# Patient Record
Sex: MALE | Race: WHITE | NOT HISPANIC OR LATINO | URBAN - METROPOLITAN AREA
[De-identification: names, ages, dates, MRNs, and addresses within clinical notes are randomized per-mention and may not be internally consistent; named-entity substitution may affect disease eponyms.]

---

## 2022-10-21 ENCOUNTER — INPATIENT (INPATIENT)
Facility: HOSPITAL | Age: 87
LOS: 4 days | Discharge: HOME | DRG: 388 | End: 2022-10-26
Attending: SURGERY | Admitting: SURGERY
Payer: MEDICARE

## 2022-10-21 VITALS
RESPIRATION RATE: 20 BRPM | SYSTOLIC BLOOD PRESSURE: 151 MMHG | WEIGHT: 176.37 LBS | OXYGEN SATURATION: 97 % | HEIGHT: 71 IN | TEMPERATURE: 98 F | HEART RATE: 86 BPM | DIASTOLIC BLOOD PRESSURE: 75 MMHG

## 2022-10-21 DIAGNOSIS — K56.609 UNSPECIFIED INTESTINAL OBSTRUCTION, UNSPECIFIED AS TO PARTIAL VERSUS COMPLETE OBSTRUCTION: ICD-10-CM

## 2022-10-21 DIAGNOSIS — Z90.49 ACQUIRED ABSENCE OF OTHER SPECIFIED PARTS OF DIGESTIVE TRACT: Chronic | ICD-10-CM

## 2022-10-21 LAB
ALBUMIN SERPL ELPH-MCNC: 3.3 G/DL — LOW (ref 3.5–5)
ALP SERPL-CCNC: 95 U/L — SIGNIFICANT CHANGE UP (ref 40–120)
ALT FLD-CCNC: 23 U/L DA — SIGNIFICANT CHANGE UP (ref 10–60)
ANION GAP SERPL CALC-SCNC: 7 MMOL/L — SIGNIFICANT CHANGE UP (ref 5–17)
ANISOCYTOSIS BLD QL: SLIGHT — SIGNIFICANT CHANGE UP
APPEARANCE UR: CLEAR — SIGNIFICANT CHANGE UP
APTT BLD: 28.2 SEC — SIGNIFICANT CHANGE UP (ref 27.5–35.5)
AST SERPL-CCNC: 13 U/L — SIGNIFICANT CHANGE UP (ref 10–40)
BACTERIA # UR AUTO: ABNORMAL /HPF
BASE EXCESS BLDV CALC-SCNC: 4.7 MMOL/L — SIGNIFICANT CHANGE UP
BASOPHILS # BLD AUTO: 0.01 K/UL — SIGNIFICANT CHANGE UP (ref 0–0.2)
BASOPHILS NFR BLD AUTO: 0.2 % — SIGNIFICANT CHANGE UP (ref 0–2)
BILIRUB SERPL-MCNC: 0.8 MG/DL — SIGNIFICANT CHANGE UP (ref 0.2–1.2)
BILIRUB UR-MCNC: NEGATIVE — SIGNIFICANT CHANGE UP
BLD GP AB SCN SERPL QL: SIGNIFICANT CHANGE UP
BUN SERPL-MCNC: 24 MG/DL — HIGH (ref 7–18)
CA-I SERPL-SCNC: SIGNIFICANT CHANGE UP MMOL/L (ref 1.15–1.33)
CALCIUM SERPL-MCNC: 9.2 MG/DL — SIGNIFICANT CHANGE UP (ref 8.4–10.5)
CHLORIDE SERPL-SCNC: 105 MMOL/L — SIGNIFICANT CHANGE UP (ref 96–108)
CO2 SERPL-SCNC: 29 MMOL/L — SIGNIFICANT CHANGE UP (ref 22–31)
COLOR SPEC: YELLOW — SIGNIFICANT CHANGE UP
CREAT SERPL-MCNC: 1.38 MG/DL — HIGH (ref 0.5–1.3)
DIFF PNL FLD: NEGATIVE — SIGNIFICANT CHANGE UP
EGFR: 50 ML/MIN/1.73M2 — LOW
EOSINOPHIL # BLD AUTO: 0 K/UL — SIGNIFICANT CHANGE UP (ref 0–0.5)
EOSINOPHIL NFR BLD AUTO: 0 % — SIGNIFICANT CHANGE UP (ref 0–6)
EPI CELLS # UR: ABNORMAL /HPF
GAS PNL BLDV: 136 MMOL/L — SIGNIFICANT CHANGE UP (ref 136–145)
GAS PNL BLDV: SIGNIFICANT CHANGE UP
GLUCOSE SERPL-MCNC: 238 MG/DL — HIGH (ref 70–99)
GLUCOSE UR QL: 100 MG/DL
HCO3 BLDV-SCNC: 31 MMOL/L — HIGH (ref 22–29)
HCT VFR BLD CALC: 37.4 % — LOW (ref 39–50)
HGB BLD-MCNC: 12.5 G/DL — LOW (ref 13–17)
IMM GRANULOCYTES NFR BLD AUTO: 0.3 % — SIGNIFICANT CHANGE UP (ref 0–0.9)
INR BLD: 1.37 RATIO — HIGH (ref 0.88–1.16)
KETONES UR-MCNC: ABNORMAL
LACTATE BLDV-MCNC: 1.8 MMOL/L — SIGNIFICANT CHANGE UP (ref 0.5–2)
LACTATE SERPL-SCNC: 1.5 MMOL/L — SIGNIFICANT CHANGE UP (ref 0.7–2)
LEUKOCYTE ESTERASE UR-ACNC: NEGATIVE — SIGNIFICANT CHANGE UP
LIDOCAIN IGE QN: 47 U/L — LOW (ref 73–393)
LYMPHOCYTES # BLD AUTO: 0.32 K/UL — LOW (ref 1–3.3)
LYMPHOCYTES # BLD AUTO: 5.4 % — LOW (ref 13–44)
MACROCYTES BLD QL: SLIGHT — SIGNIFICANT CHANGE UP
MANUAL SMEAR VERIFICATION: SIGNIFICANT CHANGE UP
MCHC RBC-ENTMCNC: 32.5 PG — SIGNIFICANT CHANGE UP (ref 27–34)
MCHC RBC-ENTMCNC: 33.4 GM/DL — SIGNIFICANT CHANGE UP (ref 32–36)
MCV RBC AUTO: 97.1 FL — SIGNIFICANT CHANGE UP (ref 80–100)
MONOCYTES # BLD AUTO: 0.73 K/UL — SIGNIFICANT CHANGE UP (ref 0–0.9)
MONOCYTES NFR BLD AUTO: 12.2 % — SIGNIFICANT CHANGE UP (ref 2–14)
NEUTROPHILS # BLD AUTO: 4.88 K/UL — SIGNIFICANT CHANGE UP (ref 1.8–7.4)
NEUTROPHILS NFR BLD AUTO: 81.9 % — HIGH (ref 43–77)
NITRITE UR-MCNC: NEGATIVE — SIGNIFICANT CHANGE UP
NRBC # BLD: 0 /100 WBCS — SIGNIFICANT CHANGE UP (ref 0–0)
OVALOCYTES BLD QL SMEAR: SLIGHT — SIGNIFICANT CHANGE UP
PCO2 BLDV: 51 MMHG — SIGNIFICANT CHANGE UP (ref 42–55)
PH BLDV: 7.39 — SIGNIFICANT CHANGE UP (ref 7.32–7.43)
PH UR: 5 — SIGNIFICANT CHANGE UP (ref 5–8)
PLAT MORPH BLD: NORMAL — SIGNIFICANT CHANGE UP
PLATELET # BLD AUTO: 153 K/UL — SIGNIFICANT CHANGE UP (ref 150–400)
PO2 BLDV: 25 MMHG — SIGNIFICANT CHANGE UP
POIKILOCYTOSIS BLD QL AUTO: SLIGHT — SIGNIFICANT CHANGE UP
POTASSIUM BLDV-SCNC: 4.8 MMOL/L — SIGNIFICANT CHANGE UP (ref 3.5–5.1)
POTASSIUM SERPL-MCNC: 4.8 MMOL/L — SIGNIFICANT CHANGE UP (ref 3.5–5.3)
POTASSIUM SERPL-SCNC: 4.8 MMOL/L — SIGNIFICANT CHANGE UP (ref 3.5–5.3)
PROT SERPL-MCNC: 7.2 G/DL — SIGNIFICANT CHANGE UP (ref 6–8.3)
PROT UR-MCNC: 15
PROTHROM AB SERPL-ACNC: 16.4 SEC — HIGH (ref 10.5–13.4)
RBC # BLD: 3.85 M/UL — LOW (ref 4.2–5.8)
RBC # FLD: 13 % — SIGNIFICANT CHANGE UP (ref 10.3–14.5)
RBC BLD AUTO: ABNORMAL
RBC CASTS # UR COMP ASSIST: SIGNIFICANT CHANGE UP /HPF (ref 0–2)
SAO2 % BLDV: 43.1 % — SIGNIFICANT CHANGE UP
SARS-COV-2 RNA SPEC QL NAA+PROBE: SIGNIFICANT CHANGE UP
SODIUM SERPL-SCNC: 141 MMOL/L — SIGNIFICANT CHANGE UP (ref 135–145)
SP GR SPEC: 1.01 — SIGNIFICANT CHANGE UP (ref 1.01–1.02)
UROBILINOGEN FLD QL: NEGATIVE — SIGNIFICANT CHANGE UP
WBC # BLD: 5.96 K/UL — SIGNIFICANT CHANGE UP (ref 3.8–10.5)
WBC # FLD AUTO: 5.96 K/UL — SIGNIFICANT CHANGE UP (ref 3.8–10.5)
WBC UR QL: SIGNIFICANT CHANGE UP /HPF (ref 0–5)

## 2022-10-21 PROCEDURE — 99221 1ST HOSP IP/OBS SF/LOW 40: CPT | Mod: GC

## 2022-10-21 PROCEDURE — 71045 X-RAY EXAM CHEST 1 VIEW: CPT | Mod: 26

## 2022-10-21 PROCEDURE — 71045 X-RAY EXAM CHEST 1 VIEW: CPT | Mod: 26,77

## 2022-10-21 PROCEDURE — 99285 EMERGENCY DEPT VISIT HI MDM: CPT

## 2022-10-21 PROCEDURE — G1004: CPT

## 2022-10-21 PROCEDURE — 74177 CT ABD & PELVIS W/CONTRAST: CPT | Mod: 26,ME

## 2022-10-21 RX ORDER — ONDANSETRON 8 MG/1
4 TABLET, FILM COATED ORAL ONCE
Refills: 0 | Status: COMPLETED | OUTPATIENT
Start: 2022-10-21 | End: 2022-10-21

## 2022-10-21 RX ORDER — MORPHINE SULFATE 50 MG/1
4 CAPSULE, EXTENDED RELEASE ORAL ONCE
Refills: 0 | Status: DISCONTINUED | OUTPATIENT
Start: 2022-10-21 | End: 2022-10-21

## 2022-10-21 RX ORDER — SODIUM CHLORIDE 9 MG/ML
1000 INJECTION INTRAMUSCULAR; INTRAVENOUS; SUBCUTANEOUS ONCE
Refills: 0 | Status: COMPLETED | OUTPATIENT
Start: 2022-10-21 | End: 2022-10-21

## 2022-10-21 RX ORDER — PANTOPRAZOLE SODIUM 20 MG/1
8 TABLET, DELAYED RELEASE ORAL
Qty: 80 | Refills: 0 | Status: DISCONTINUED | OUTPATIENT
Start: 2022-10-21 | End: 2022-10-21

## 2022-10-21 RX ORDER — SODIUM CHLORIDE 9 MG/ML
1000 INJECTION, SOLUTION INTRAVENOUS
Refills: 0 | Status: DISCONTINUED | OUTPATIENT
Start: 2022-10-21 | End: 2022-10-26

## 2022-10-21 RX ORDER — PANTOPRAZOLE SODIUM 20 MG/1
40 TABLET, DELAYED RELEASE ORAL
Refills: 0 | Status: DISCONTINUED | OUTPATIENT
Start: 2022-10-21 | End: 2022-10-26

## 2022-10-21 RX ORDER — ONDANSETRON 8 MG/1
4 TABLET, FILM COATED ORAL EVERY 6 HOURS
Refills: 0 | Status: DISCONTINUED | OUTPATIENT
Start: 2022-10-21 | End: 2022-10-26

## 2022-10-21 RX ORDER — FAMOTIDINE 10 MG/ML
20 INJECTION INTRAVENOUS ONCE
Refills: 0 | Status: COMPLETED | OUTPATIENT
Start: 2022-10-21 | End: 2022-10-21

## 2022-10-21 RX ORDER — ONDANSETRON 8 MG/1
4 TABLET, FILM COATED ORAL ONCE
Refills: 0 | Status: DISCONTINUED | OUTPATIENT
Start: 2022-10-21 | End: 2022-10-26

## 2022-10-21 RX ADMIN — FAMOTIDINE 20 MILLIGRAM(S): 10 INJECTION INTRAVENOUS at 11:18

## 2022-10-21 RX ADMIN — ONDANSETRON 4 MILLIGRAM(S): 8 TABLET, FILM COATED ORAL at 11:18

## 2022-10-21 RX ADMIN — MORPHINE SULFATE 4 MILLIGRAM(S): 50 CAPSULE, EXTENDED RELEASE ORAL at 18:13

## 2022-10-21 RX ADMIN — MORPHINE SULFATE 4 MILLIGRAM(S): 50 CAPSULE, EXTENDED RELEASE ORAL at 17:43

## 2022-10-21 RX ADMIN — ONDANSETRON 4 MILLIGRAM(S): 8 TABLET, FILM COATED ORAL at 20:10

## 2022-10-21 RX ADMIN — PANTOPRAZOLE SODIUM 10 MG/HR: 20 TABLET, DELAYED RELEASE ORAL at 11:30

## 2022-10-21 RX ADMIN — MORPHINE SULFATE 4 MILLIGRAM(S): 50 CAPSULE, EXTENDED RELEASE ORAL at 11:19

## 2022-10-21 RX ADMIN — MORPHINE SULFATE 4 MILLIGRAM(S): 50 CAPSULE, EXTENDED RELEASE ORAL at 11:49

## 2022-10-21 RX ADMIN — SODIUM CHLORIDE 120 MILLILITER(S): 9 INJECTION, SOLUTION INTRAVENOUS at 20:10

## 2022-10-21 RX ADMIN — Medication 30 MILLILITER(S): at 11:18

## 2022-10-21 RX ADMIN — SODIUM CHLORIDE 1000 MILLILITER(S): 9 INJECTION INTRAMUSCULAR; INTRAVENOUS; SUBCUTANEOUS at 11:19

## 2022-10-21 NOTE — H&P ADULT - NSHPLABSRESULTS_GEN_ALL_CORE
Vital Signs Last 24 Hrs  T(C): 36.8 (21 Oct 2022 15:47), Max: 36.9 (21 Oct 2022 10:11)  T(F): 98.3 (21 Oct 2022 15:47), Max: 98.5 (21 Oct 2022 10:11)  HR: 79 (21 Oct 2022 15:47) (79 - 86)  BP: 155/81 (21 Oct 2022 15:47) (151/75 - 155/81)  BP(mean): --  RR: 20 (21 Oct 2022 15:47) (20 - 20)  SpO2: 98% (21 Oct 2022 15:47) (97% - 98%)    Parameters below as of 21 Oct 2022 15:47  Patient On (Oxygen Delivery Method): room air                              12.5   5.96  )-----------( 153      ( 21 Oct 2022 11:09 )             37.4       10-21    141  |  105  |  24<H>  ----------------------------<  238<H>  4.8   |  29  |  1.38<H>    Ca    9.2      21 Oct 2022 11:09    TPro  7.2  /  Alb  3.3<L>  /  TBili  0.8  /  DBili  x   /  AST  13  /  ALT  23  /  AlkPhos  95  10-21        < from: CT Abdomen and Pelvis w/ IV Cont (10.21.22 @ 14:49) >    FINDINGS:  LOWER CHEST: Mildatelectatic changes. Trace bilateral pleural fluid.    LIVER: Within normal limits.  BILE DUCTS: Intrahepatic and extra hepatic bile duct dilatation which may   be postsurgical.  GALLBLADDER: Cholecystectomy.  SPLEEN: Within normal limits.  PANCREAS:Within normal limits.  ADRENALS: Within normal limits.  KIDNEYS/URETERS: Within normal limits.    BLADDER: Within normal limits.  REPRODUCTIVE ORGANS: Radiopaque fiduciary markers.    BOWEL: Dilated small bowel to the point of segmental small bowel wall   thickening in the right side of the abdomen with transition seen on   series 2 image 88. Differential diagnostic possibilities include   segmental enteritis from ischemia with proximal dilatation. Infectious or   inflammatory etiologies are within the differential diagnosis.  Colonic diverticuli without gross inflammation.    PERITONEUM: Mild ascites in the right side of the abdomen laterally.  VESSELS: Vascular calcifications. No thrombus is clearly identified.  RETROPERITONEUM/LYMPH NODES:No lymphadenopathy.  ABDOMINAL WALL: Small umbilical hernia containing fat. Left inguinal   hernia containing fat.  BONES: Degenerative changes of bone.    IMPRESSION:  Distended small bowel with transition seen in the right side of the   abdomen where there is a long segment of thick-walled small bowel with   adjacent mild ascites. This may be related to enteritis from bowel   ischemia. Small bowel obstruction with an inflamed segment of small bowel   may be within the differential diagnosis as well as enteritis from   infectious or inflammatory etiologies.    < end of copied text >

## 2022-10-21 NOTE — CONSULT NOTE ADULT - SUBJECTIVE AND OBJECTIVE BOX
Patient is a 87y old  Male who presents with a chief complaint of Hematemesis (21 Oct 2022 19:57)      HPI:  87 year-old male with PMH of BCC on left ear s/p wide excision, prostate CA s/p brachytherapy (seed implants seen on CT, however pt unsure of details), PSH of lap cholecystectomy and appendectomy; presented to the hospital with complaints of abdominal pain with 4 day history of nausea, vomiting and eventually hematemesis. Had bowel movements and passing flatus. Admits to fever yesterday but no chills. No similar episodes in the past. Denies smoking, occasional drinks, mostly 1 bottle of beer each time.     ED work up revealed pSBO with findings suspicious of bowel ischemia given trace ascites. No leukocytosis, lactate wnl, vitals stable. Abdomen exam benign. Given findings c/w bowel obstruction, NGT placed at bedside with 250cc of bilious return.  (21 Oct 2022 19:57)      PAST MEDICAL & SURGICAL HISTORY:  Basal cell carcinoma  S/P cholecystectomy  S/P appendectomy          SOCIAL HX:   Smoking                         ETOH                               ROS:      Allergies  No Known Allergies  Intolerances          PHYSICAL EXAM    ICU Vital Signs Last 24 Hrs  T(C): 36.8 (21 Oct 2022 20:33), Max: 36.9 (21 Oct 2022 10:11)  T(F): 98.2 (21 Oct 2022 20:33), Max: 98.5 (21 Oct 2022 10:11)  HR: 73 (21 Oct 2022 20:33) (73 - 86)  BP: 151/78 (21 Oct 2022 20:33) (151/75 - 155/81)  RR: 20 (21 Oct 2022 20:33) (20 - 20)  SpO2: 97% (21 Oct 2022 20:33) (97% - 98%)    O2 Parameters below as of 21 Oct 2022 20:33  Patient On (Oxygen Delivery Method): room air          LABS:                          12.5   5.96  )-----------( 153      ( 21 Oct 2022 11:09 )             37.4                                               10-    141  |  105  |  24<H>  ----------------------------<  238<H>  4.8   |  29  |  1.38<H>    Ca    9.2      21 Oct 2022 11:09    TPro  7.2  /  Alb  3.3<L>  /  TBili  0.8  /  DBili  x   /  AST  13  /  ALT  23  /  AlkPhos  95  10-21      PT/INR - ( 21 Oct 2022 11:09 )   PT: 16.4 sec;   INR: 1.37 ratio         PTT - ( 21 Oct 2022 11:09 )  PTT:28.2 sec                                       Urinalysis Basic - ( 21 Oct 2022 16:35 )    Color: Yellow / Appearance: Clear / S.015 / pH: x  Gluc: x / Ketone: Small  / Bili: Negative / Urobili: Negative   Blood: x / Protein: 15 / Nitrite: Negative   Leuk Esterase: Negative / RBC: 0-2 /HPF / WBC 0-2 /HPF   Sq Epi: x / Non Sq Epi: Occasional /HPF / Bacteria: Trace /HPF                                                  LIVER FUNCTIONS - ( 21 Oct 2022 11:09 )  Alb: 3.3 g/dL / Pro: 7.2 g/dL / ALK PHOS: 95 U/L / ALT: 23 U/L DA / AST: 13 U/L / GGT: x                                                MEDICATIONS  (STANDING):  dextrose 5% + sodium chloride 0.45%. 1000 milliLiter(s) (120 mL/Hr) IV Continuous <Continuous>  ondansetron Injectable 4 milliGRAM(s) IV Push once  pantoprazole  Injectable 40 milliGRAM(s) IV Push two times a day    MEDICATIONS  (PRN):  ondansetron Injectable 4 milliGRAM(s) IV Push every 6 hours PRN Nausea and/or Vomiting         Patient is a 87y old  Male who presents with a chief complaint of Hematemesis (21 Oct 2022 19:57)      HPI:  87 year-old male with 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 Had bowel movements and passing flatus. Admits to fever yesterday but no chills. No similar episodes in the past. Denies smoking, occasional drinks, mostly 1 bottle of beer each time.     ED work up revealed SBO with findings suspicious of bowel ischemia given trace ascites. No leukocytosis, lactate wnl, vitals stable. Abdomen exam benign. Given findings c/w bowel obstruction, NGT placed at bedside with 250cc of bilious return.  (21 Oct 2022 19:57)  Medicine was consulted for co-management of comorbidities.       PAST MEDICAL & SURGICAL HISTORY:  Basal cell carcinoma  S/P cholecystectomy  S/P appendectomy          SOCIAL HX:   Smoking                         ETOH                               ROS:    Constitutional- no fever, chills, weight loss, weight gain or generalized weakness  Eyes – no vision loss or changes, no pain, no redness  ENT  – No hearing changes, no tinnitus, no discharge, no pain  - No runny nose, no congestion, no pain  - No sore throat, no hoarseness, no mouth sores, no voice change  CVS – No chest pain/no palpitations, or SOB  Respiratory - no cough, hemoptysis, wheezing, or SOB  GI – no dysphagia, heartburn, nausea, anorexia, emesis, diarrhea, abd pain, or change in bowel habits   – no frequency, urgency, hesitancy, nocturia, discharge, or weak stream  Skin – no rashes, lumps, or pruritus  MAYA – no joint pain, stiffness, back pain, redness or swelling in joints  Psych – no confusion, depression , anxiety, or insomnia  Neuro – no headache dizziness, syncope, seizures, tremors, numbness, amnesia, or falls  Endocrine – no cold, heat intolerance, sweating, excessive hunger or thirst    Allergies  No Known Allergies  Intolerances          PHYSICAL EXAM    ICU Vital Signs Last 24 Hrs  T(C): 36.8 (21 Oct 2022 20:33), Max: 36.9 (21 Oct 2022 10:11)  T(F): 98.2 (21 Oct 2022 20:33), Max: 98.5 (21 Oct 2022 10:11)  HR: 73 (21 Oct 2022 20:33) (73 - 86)  BP: 151/78 (21 Oct 2022 20:33) (151/75 - 155/81)  RR: 20 (21 Oct 2022 20:33) (20 - 20)  SpO2: 97% (21 Oct 2022 20:33) (97% - 98%)    O2 Parameters below as of 21 Oct 2022 20:33  Patient On (Oxygen Delivery Method): room air    GENERAL: NAD, overweight, sat well on RA   HEAD:  Atraumatic, Normocephalic  EYES:  conjunctiva and sclera clear  NECK: Supple, No JVD, Normal thyroid  CHEST/LUNG: Clear to auscultation. Clear to percussion bilaterally; No rales, rhonchi, wheezing, or rubs  HEART: Regular rate and rhythm; No murmurs, rubs, or gallops  ABDOMEN: Soft, Nontender, Nondistended; Bowel sounds present, no pain or masses on palpation   NERVOUS SYSTEM:  Alert & Oriented X3  EXTREMITIES:  2+ Peripheral Pulses, No clubbing, cyanosis, or edema  : voiding well   SKIN: warm, dry      LABS:                          12.5   5.96  )-----------( 153      ( 21 Oct 2022 11:09 )             37.4                                               10-21    141  |  105  |  24<H>  ----------------------------<  238<H>  4.8   |  29  |  1.38<H>    Ca    9.2      21 Oct 2022 11:09    TPro  7.2  /  Alb  3.3<L>  /  TBili  0.8  /  DBili  x   /  AST  13  /  ALT  23  /  AlkPhos  95  10-21      PT/INR - ( 21 Oct 2022 11:09 )   PT: 16.4 sec;   INR: 1.37 ratio         PTT - ( 21 Oct 2022 11:09 )  PTT:28.2 sec                                       Urinalysis Basic - ( 21 Oct 2022 16:35 )    Color: Yellow / Appearance: Clear / S.015 / pH: x  Gluc: x / Ketone: Small  / Bili: Negative / Urobili: Negative   Blood: x / Protein: 15 / Nitrite: Negative   Leuk Esterase: Negative / RBC: 0-2 /HPF / WBC 0-2 /HPF   Sq Epi: x / Non Sq Epi: Occasional /HPF / Bacteria: Trace /HPF                                                  LIVER FUNCTIONS - ( 21 Oct 2022 11:09 )  Alb: 3.3 g/dL / Pro: 7.2 g/dL / ALK PHOS: 95 U/L / ALT: 23 U/L DA / AST: 13 U/L / GGT: x                                                MEDICATIONS  (STANDING):  dextrose 5% + sodium chloride 0.45%. 1000 milliLiter(s) (120 mL/Hr) IV Continuous <Continuous>  ondansetron Injectable 4 milliGRAM(s) IV Push once  pantoprazole  Injectable 40 milliGRAM(s) IV Push two times a day    MEDICATIONS  (PRN):  ondansetron Injectable 4 milliGRAM(s) IV Push every 6 hours PRN Nausea and/or Vomiting         Patient is a 87y old  Male who presents with a chief complaint of Hematemesis (21 Oct 2022 19:57)      HPI:  87 year-old male with 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 Had bowel movements and passing flatus. Admits to fever yesterday but no chills. No similar episodes in the past. Denies smoking, occasional drinks, mostly 1 bottle of beer each time.     ED work up revealed SBO with findings suspicious of bowel ischemia given trace ascites. No leukocytosis, lactate wnl, vitals stable. Abdomen exam benign. Given findings c/w bowel obstruction, NGT placed at bedside with 250cc of bilious return.  (21 Oct 2022 19:57)  Medicine was consulted for co-management of comorbidities.       Attempted to call family to find out more information but unable to reach them. [phone].    PAST MEDICAL & SURGICAL HISTORY:  Basal cell carcinoma  S/P cholecystectomy  S/P appendectomy      SOCIAL HX:   unknown     ROS:  unreliable as patient seemed confused but denies any complaints     Allergies  No Known Allergies  Intolerances          PHYSICAL EXAM    ICU Vital Signs Last 24 Hrs  T(C): 36.8 (21 Oct 2022 20:33), Max: 36.9 (21 Oct 2022 10:11)  T(F): 98.2 (21 Oct 2022 20:33), Max: 98.5 (21 Oct 2022 10:11)  HR: 73 (21 Oct 2022 20:33) (73 - 86)  BP: 151/78 (21 Oct 2022 20:33) (151/75 - 155/81)  RR: 20 (21 Oct 2022 20:33) (20 - 20)  SpO2: 97% (21 Oct 2022 20:33) (97% - 98%)    O2 Parameters below as of 21 Oct 2022 20:33  Patient On (Oxygen Delivery Method): room air    GENERAL: NAD, overweight, sat well on RA, pale    HEAD:  Atraumatic, Normocephalic  EYES:  conjunctiva and sclera clear  NECK: Supple, No JVD, Normal thyroid  CHEST/LUNG: Clear to auscultation. Clear to percussion bilaterally; No rales, rhonchi, wheezing, or rubs  HEART: Regular rate and rhythm; No murmurs, rubs, or gallops  ABDOMEN: Soft, Nontender, Nondistended; Bowel sounds present, no pain or masses on palpation   NERVOUS SYSTEM:  Alert & Oriented X1-2, confused   EXTREMITIES:  2+ Peripheral Pulses, No clubbing, cyanosis, or edema  : voiding well   SKIN: warm, dry      LABS:                          12.5   5.96  )-----------( 153      ( 21 Oct 2022 11:09 )             37.4                                               10-    141  |  105  |  24<H>  ----------------------------<  238<H>  4.8   |  29  |  1.38<H>    Ca    9.2      21 Oct 2022 11:09    TPro  7.2  /  Alb  3.3<L>  /  TBili  0.8  /  DBili  x   /  AST  13  /  ALT  23  /  AlkPhos  95  10-      PT/INR - ( 21 Oct 2022 11:09 )   PT: 16.4 sec;   INR: 1.37 ratio         PTT - ( 21 Oct 2022 11:09 )  PTT:28.2 sec                                       Urinalysis Basic - ( 21 Oct 2022 16:35 )    Color: Yellow / Appearance: Clear / S.015 / pH: x  Gluc: x / Ketone: Small  / Bili: Negative / Urobili: Negative   Blood: x / Protein: 15 / Nitrite: Negative   Leuk Esterase: Negative / RBC: 0-2 /HPF / WBC 0-2 /HPF   Sq Epi: x / Non Sq Epi: Occasional /HPF / Bacteria: Trace /HPF                                                  LIVER FUNCTIONS - ( 21 Oct 2022 11:09 )  Alb: 3.3 g/dL / Pro: 7.2 g/dL / ALK PHOS: 95 U/L / ALT: 23 U/L DA / AST: 13 U/L / GGT: x                                                MEDICATIONS  (STANDING):  dextrose 5% + sodium chloride 0.45%. 1000 milliLiter(s) (120 mL/Hr) IV Continuous <Continuous>  ondansetron Injectable 4 milliGRAM(s) IV Push once  pantoprazole  Injectable 40 milliGRAM(s) IV Push two times a day    MEDICATIONS  (PRN):  ondansetron Injectable 4 milliGRAM(s) IV Push every 6 hours PRN Nausea and/or Vomiting

## 2022-10-21 NOTE — H&P ADULT - HISTORY OF PRESENT ILLNESS
87 year-old male with PMH of BCC on left ear s/p wide excision, ?prostate CA s/p brachytherapy (seed implants seen on CT, however pt unsure of details), PSH of lap cholecystectomy and appendectomy; presented to the hospital with complaints of abdominal pain with 4 day history of nausea, vomiting and eventually hematemesis. Had bowel movements and passing flatus. Admits to fever yesterday but no chills. No similar episodes in the past. Denies smoking, occasional drinks, mostly 1 bottle of beer each time.     ED work up revealed pSBO with findings suspicious of bowel ischemia given trace ascites. No leukocytosis, lactate wnl, vitals stable. Abdomen exam benign. Given findings c/w bowel obstruction, NGT placed at bedside with 250cc of bilious return.

## 2022-10-21 NOTE — ED PROVIDER NOTE - OBJECTIVE STATEMENT
87-year-old gentleman with past medical history of basal cell carcinoma, hernia repair presents with daughter for abdominal pain with associated nausea and vomiting.  The daughter states that the vomiting has been several episodes and looks like blood-tinged sputum.  Is also dark in color.  Not eating for 3 days.  Had a bowel movement last night and it was normal per patient.  No fever no chills.  No urinary complaints.  No trauma.  This is the first time he is experiencing the symptoms.

## 2022-10-21 NOTE — CONSULT NOTE ADULT - ATTENDING COMMENTS
Vital Signs Last 24 Hrs  T(C): 36.6 (22 Oct 2022 00:00), Max: 36.9 (21 Oct 2022 10:11)  T(F): 97.8 (22 Oct 2022 00:00), Max: 98.5 (21 Oct 2022 10:11)  HR: 86 (22 Oct 2022 00:00) (73 - 86)  BP: 165/78 (22 Oct 2022 00:00) (151/75 - 165/78)  BP(mean): --  RR: 18 (22 Oct 2022 00:00) (18 - 20)  SpO2: 98% (22 Oct 2022 00:00) (97% - 98%)  Patient On (Oxygen Delivery Method): room air    Labs and imaging studies noted.    Assessment and plan: Patient is a 86 yo male with PMH of BCC on left ear s/p wide excision, prostate CA s/p brachytherapy (seed implants seen on CT, however pt unsure of details), PSHx of lap cholecystectomy and appendectomy; presented to the hospital with complaints of abdominal pain with 4 day history of nausea, vomiting and eventually hematemesis. Had bowel movements and passing flatus. Admits to fever yesterday but no chills. No similar episodes in the past.     Vital Signs Last 24 Hrs  T(C): 36.6 (22 Oct 2022 00:00), Max: 36.9 (21 Oct 2022 10:11)  T(F): 97.8 (22 Oct 2022 00:00), Max: 98.5 (21 Oct 2022 10:11)  HR: 86 (22 Oct 2022 00:00) (73 - 86)  BP: 165/78 (22 Oct 2022 00:00) (151/75 - 165/78)  BP(mean): --  RR: 18 (22 Oct 2022 00:00) (18 - 20)  SpO2: 98% (22 Oct 2022 00:00) (97% - 98%)  Patient On (Oxygen Delivery Method): room air  Physical exam as above.     Labs and imaging studies noted.    In ED work up CT revealed SBO with findings suspicious of bowel ischemia given trace ascites. No leukocytosis, lactate wnl, vitals stable. Abdomen exam benign. Surgery team consulted. NGT placed at bedside with 250cc of bilious return.      Assessment and plan: 86 yo male with PMH of BCC on left ear s/p wide excision, prostate CA s/p brachytherapy (seed implants seen on CT, however pt unsure of details), PSHx of lap cholecystectomy and appendectomy; p/w c/o abdominal pain, a/w nausea vomiting and episode of hematemesis admitted for SBO and concern of bowel ischemia. Medicine team consulted ( [facility]) for management of comorbidities.    SBO- concern of bowel ischemia  Acute encephalopathy  h/o BCC on left ear s/p wide excision  Prostrate cancer s/p brachytherapy  h/o appendectomy  h/o Cholecystectomy    - p/w abd pain N/V, hematemesis,  s/p NG tube in place, bilious.  - lactate negative  - NPO, iv fluids, surgical management per surgery.  - consider iv zosyn in setting of possible bowel ischemia.  - consider CT abd angio. hold off anticoagulation in setting of h/o hematemesis  - follow d dimer, monitor H and H.  - consider CT head, UA neg f/u CXR  - scd for dvt ppx

## 2022-10-21 NOTE — ED PROVIDER NOTE - CLINICAL SUMMARY MEDICAL DECISION MAKING FREE TEXT BOX
ATTG: : abd pain with associated vomiting concerns include but not limited to infection / inflammation will check labs, check xray check ct ap, check urinalysis, check ct and re eval for dispo

## 2022-10-21 NOTE — H&P ADULT - NSHPPHYSICALEXAM_GEN_ALL_CORE
General: alert and oriented, not in acute distress  Respiratory: breathing unlabored  GI: soft, nondistended, nontender; NGT bilious  Extremities: no pedal edema  Psych: mood appropriate

## 2022-10-21 NOTE — ED PROVIDER NOTE - PHYSICAL EXAMINATION
Gen.  elderly male, no apparent resp distress  HEENT:  perrl eomi  Lungs:  b/l bs  CVS: S1S2   Abd;  soft no tender no distention on guarding  Ext: no edema no erythema   Neuro: aaox3 no focal deficits  MSK: strength 5/5 b/l upper and lower ext.

## 2022-10-21 NOTE — CONSULT NOTE ADULT - ASSESSMENT
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 Admitted to surgery for SBO r/o bowel ischemia. Medicine consulted for co-management of comorbidities.       SBO r/o bowel ischemia  - CT abdomen and pelvis showed SBO and possible bowel ischemia   - NPO, FS q 6 hrs while NPO   - Gentle IVF hydration   - Management as per surgery    PRETTY   - On admission, patient with Cr 1.38, unknown baseline   - PRETTY most likely pre renal in the setting of acute abdominal pain   - Send urine lytes (urine sodium, urine creatinine, urine urea)  - Monitor I/O's daily  - Avoid nephrotoxins, NSAIDS, ACEI and ARBS  - Start IVF    - Monitor BMP daily    Elevated Glucose  - Patient has no hx of DM  - Please send A1c  - Monitor FS           87 year-old male with 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 Admitted to surgery for SBO r/o bowel ischemia. Medicine consulted for co-management of comorbidities.       SBO r/o bowel ischemia  - CT abdomen and pelvis showed SBO and possible bowel ischemia   - NPO, FS q 6 hrs while NPO   - Gentle IVF hydration   - Management as per surgery    PRETTY   - On admission, patient with Cr 1.38, unknown baseline   - PRETTY most likely pre renal in the setting of acute abdominal pain   - Send urine lytes (urine sodium, urine creatinine, urine urea)  - Monitor I/O's daily  - Avoid nephrotoxins, NSAIDS, ACEI and ARBS  - Start IVF    - Monitor BMP daily    Elevated Glucose  - Patient has no hx of DM  - Please send A1c  - Monitor FS  - Target -180    Elevated blood pressure readings   - Patient has no hx of hypertension  - BP elevation may be in the setting of pain vs undiagnosed hypertension   - DASH diet once not NPO   - Monitor BP and consider starting Amlodipine 5 mg daily       Discussed with primary team.              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 Admitted to surgery for SBO r/o bowel ischemia. Medicine consulted for co-management of comorbidities.       SBO r/o bowel ischemia  - CT abdomen and pelvis showed SBO and possible bowel ischemia   - NPO, FS q 6 hrs while NPO   - Gentle IVF hydration   - Please send Bcx   - F/u CXR official read and urine cx  - Management as per surgery    Acute encephalopathy  - Patient with acute encephalopathy - confused, poor historian   - Toxic vs Metabolic vs Infectious vs dementia (unknown if at baseline)  - Consider CT head if worsened mental status or confusion  - NPO for now, hold all the oral medications   - Monitor for mental status changes   - Fall precautions/Aspiration precautions/Seizure precautions    PRETTY   - On admission, patient with Cr 1.38, unknown baseline   - PRETTY most likely pre renal in the setting of acute abdominal pain   - Send urine lytes (urine sodium, urine creatinine, urine urea)  - Monitor I/O's daily  - Avoid nephrotoxins, NSAIDS, ACEI and ARBS  - Start IVF    - Monitor BMP daily    Elevated Glucose  - Patient has no hx of DM  - Please send A1c  - Monitor FS  - Target -180    Elevated blood pressure readings   - Patient has no hx of hypertension  - BP elevation may be in the setting of pain vs undiagnosed hypertension   - DASH diet once not NPO   - Monitor BP and consider starting Amlodipine 5 mg daily       Discussed with primary team.              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 Admitted to surgery for SBO r/o bowel ischemia. Medicine consulted for co-management of comorbidities.       SBO r/o bowel ischemia  - CT abdomen and pelvis showed SBO and possible bowel ischemia   - NPO, FS q 6 hrs while NPO   - Gentle IVF hydration   - Please send Bcx   - F/u CXR official read and urine cx  - Management as per surgery    Acute encephalopathy  - Patient with acute encephalopathy - confused, poor historian   - Toxic vs Metabolic vs Infectious vs dementia (unknown if at baseline)  - Consider CT head if worsened mental status or confusion  - NPO for now, hold all the oral medications   - Monitor for mental status changes   - Fall precautions/Aspiration precautions/Seizure precautions    PRETTY   - On admission, patient with Cr 1.38, unknown baseline   - PRETTY most likely pre renal in the setting of acute abdominal pain   - Send urine lytes (urine sodium, urine creatinine, urine urea)  - Monitor I/O's daily  - Avoid nephrotoxins, NSAIDS, ACEI and ARBS  - Start IVF    - Monitor BMP daily    Elevated Glucose  - Patient has no hx of DM  - Please send A1c  - Monitor FS  - Target -180    Elevated blood pressure readings   - Patient has no hx of hypertension  - BP elevation may be in the setting of pain vs undiagnosed hypertension   - DASH diet once not NPO   - Monitor BP and consider starting Amlodipine 5 mg daily       Discussed with primary team. Please confirm home medication with family and pharmacy.              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 Admitted to surgery for SBO r/o bowel ischemia. Medicine consulted for co-management of comorbidities.       SBO r/o bowel ischemia  - CT abdomen and pelvis showed SBO and possible bowel ischemia   - NPO, FS q 6 hrs while NPO   - Gentle IVF hydration   - Please send Bcx   - F/u CXR official read and urine cx  - Consider starting broad spectrum antibiotics   - Management as per surgery    Acute encephalopathy  - Patient with acute encephalopathy - confused, poor historian   - Toxic vs Metabolic vs Infectious vs dementia (unknown if at baseline)  - Consider CT head if worsened mental status or confusion  - NPO for now, hold all the oral medications   - Monitor for mental status changes   - Please send Bcx   - F/u CXR official read and urine cx  - Consider starting broad spectrum antibiotics   - Fall precautions/Aspiration precautions/Seizure precautions    PRETTY   - On admission, patient with Cr 1.38, unknown baseline   - PRETTY most likely pre renal in the setting of acute abdominal pain   - Send urine lytes (urine sodium, urine creatinine, urine urea)  - Monitor I/O's daily  - Avoid nephrotoxins, NSAIDS, ACEI and ARBS  - Start IVF    - Monitor BMP daily    Elevated Glucose  - Patient has no hx of DM  - Please send A1c  - Monitor FS  - Target -180    Elevated blood pressure readings   - Patient has no hx of hypertension  - BP elevation may be in the setting of pain vs undiagnosed hypertension   - DASH diet once not NPO   - Monitor BP and consider starting Amlodipine 5 mg daily       Discussed with primary team. Please confirm home medication with family and pharmacy.              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 Admitted to surgery for SBO r/o bowel ischemia. Medicine consulted for co-management of comorbidities.       SBO r/o bowel ischemia  - CT abdomen and pelvis showed SBO and possible bowel ischemia   - NPO, FS q 6 hrs while NPO   - Gentle IVF hydration   - Please send Bcx   - F/u CXR official read and urine cx  - Consider starting empiric broad spectrum antibiotics   - Management as per surgery    Acute encephalopathy  - Patient with acute encephalopathy - confused, poor historian   - Toxic vs Metabolic vs Infectious vs dementia (unknown if at baseline)  - Consider CT head if worsened mental status or confusion  - NPO for now, hold all the oral medications   - Monitor for mental status changes   - Please send Bcx   - F/u CXR official read and urine cx  - Consider starting empiric broad spectrum antibiotics   - Fall precautions/Aspiration precautions/Seizure precautions    PRETTY   - On admission, patient with Cr 1.38, unknown baseline   - PRETTY most likely pre renal in the setting of acute abdominal pain   - Send urine lytes (urine sodium, urine creatinine, urine urea)  - Monitor I/O's daily  - Avoid nephrotoxins, NSAIDS, ACEI and ARBS  - Start IVF    - Monitor BMP daily    Elevated Glucose  - Patient has no hx of DM  - Please send A1c  - Monitor FS  - Target -180    Elevated blood pressure readings   - Patient has no hx of hypertension  - BP elevation may be in the setting of pain vs undiagnosed hypertension   - DASH diet once not NPO   - Monitor BP and consider starting Amlodipine 5 mg daily       Discussed with primary team. Please confirm home medication with family and pharmacy.              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 Admitted to surgery for SBO r/o bowel ischemia. Medicine consulted for co-management of comorbidities.       SBO r/o bowel ischemia  - CT abdomen and pelvis showed SBO and possible bowel ischemia   - NPO, FS q 6 hrs while NPO   - Gentle IVF hydration   - Please send Bcx, d dimer  - F/u CXR official read and urine cx  - Consider starting empiric broad spectrum antibiotics   - Management as per surgery    Acute encephalopathy  - Patient with acute encephalopathy - confused, poor historian   - Toxic vs Metabolic vs Infectious vs dementia (unknown if at baseline)  - Consider CT head if worsened mental status or confusion  - NPO for now, hold all the oral medications   - Monitor for mental status changes   - Please send Bcx   - F/u CXR official read and urine cx  - Consider starting empiric broad spectrum antibiotics   - Fall precautions/Aspiration precautions/Seizure precautions    PRETTY   - On admission, patient with Cr 1.38, unknown baseline   - PRETTY most likely pre renal in the setting of acute abdominal pain   - Send urine lytes (urine sodium, urine creatinine, urine urea)  - Monitor I/O's daily  - Avoid nephrotoxins, NSAIDS, ACEI and ARBS  - Start IVF    - Monitor BMP daily    Elevated Glucose  - Patient has no hx of DM  - Please send A1c  - Monitor FS  - Target -180    Elevated blood pressure readings   - Patient has no hx of hypertension  - BP elevation may be in the setting of pain vs undiagnosed hypertension   - DASH diet once not NPO   - Monitor BP and consider starting Amlodipine 5 mg daily       Discussed with primary team. Please confirm home medication with family and pharmacy.              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 Admitted to surgery for SBO r/o bowel ischemia. Medicine consulted for co-management of comorbidities.       SBO r/o bowel ischemia  - CT abdomen and pelvis showed SBO and possible bowel ischemia   - NPO, FS q 6 hrs while NPO   - Gentle IVF hydration   - Please send Bcx, d dimer  - F/u CXR official read and urine cx  - Consider starting empiric broad spectrum antibiotics   - Management as per surgery    Acute encephalopathy  - Patient with acute encephalopathy - confused, poor historian   - Toxic vs Metabolic vs Infectious vs dementia (unknown if at baseline)  - Consider CT head if worsened mental status or confusion  - NPO for now, hold all the oral medications   - Monitor for mental status changes   - Please send Bcx   - F/u CXR official read and urine cx  - Consider starting empiric broad spectrum antibiotics   - Fall precautions/Aspiration precautions/Seizure precautions    PRETTY   - On admission, patient with Cr 1.38, unknown baseline   - PRETTY most likely pre renal in the setting of acute abdominal pain   - Send urine lytes (urine sodium, urine creatinine, urine urea)  - Monitor I/O's daily  - Avoid nephrotoxins, NSAIDS, ACEI and ARBS  - Start IVF    - Monitor BMP daily    Elevated Glucose  - Patient with unknown hx of DM  - Please send A1c  - Monitor FS  - Consider starting sliding scale if BS >200  - Target -180    Elevated blood pressure readings   - Patient with unknown hx of hypertension  - BP elevation may be in the setting of pain vs undiagnosed hypertension   - DASH diet once not NPO   - Monitor BP and consider starting Amlodipine 5 mg daily or home meds if on any antihypertensive       Discussed with primary team. Please confirm home medication with family and pharmacy.

## 2022-10-21 NOTE — ED PROVIDER NOTE - PROGRESS NOTE DETAILS
patient signed out to me by Dr. Martin pending CT. concern for SBO on CT. lactate negative so less concern for ischemia. will admit to surgical team. attempted to reach patient's [id number] for most recent update but mailbox full. Pascual Cuevas

## 2022-10-21 NOTE — H&P ADULT - ASSESSMENT
87 yoM with SBO r/o bowel ischemia    afeb, vss; no leukocytosis, lactate normal  PRETTY 1.3, on IVF; otherwise wnl  NGT placed at bedside, 250cc bilious return    - NPO, IVF  - NGT to LCS; CXR to confirm NGT placement  - chem dvt ppx held due to hematemesis, GI ppx with protonix  - [facility] consulted, Dr. Hutton to see  - close monitoring with serial abd exams  - d/w Dr. Cohen

## 2022-10-22 LAB
ANION GAP SERPL CALC-SCNC: 7 MMOL/L — SIGNIFICANT CHANGE UP (ref 5–17)
BUN SERPL-MCNC: 17 MG/DL — SIGNIFICANT CHANGE UP (ref 7–18)
CALCIUM SERPL-MCNC: 8.6 MG/DL — SIGNIFICANT CHANGE UP (ref 8.4–10.5)
CHLORIDE SERPL-SCNC: 107 MMOL/L — SIGNIFICANT CHANGE UP (ref 96–108)
CO2 SERPL-SCNC: 26 MMOL/L — SIGNIFICANT CHANGE UP (ref 22–31)
CREAT SERPL-MCNC: 1.2 MG/DL — SIGNIFICANT CHANGE UP (ref 0.5–1.3)
EGFR: 59 ML/MIN/1.73M2 — LOW
GLUCOSE SERPL-MCNC: 193 MG/DL — HIGH (ref 70–99)
HCT VFR BLD CALC: 34.1 % — LOW (ref 39–50)
HGB BLD-MCNC: 11.3 G/DL — LOW (ref 13–17)
MCHC RBC-ENTMCNC: 32.2 PG — SIGNIFICANT CHANGE UP (ref 27–34)
MCHC RBC-ENTMCNC: 33.1 GM/DL — SIGNIFICANT CHANGE UP (ref 32–36)
MCV RBC AUTO: 97.2 FL — SIGNIFICANT CHANGE UP (ref 80–100)
NRBC # BLD: 0 /100 WBCS — SIGNIFICANT CHANGE UP (ref 0–0)
PLATELET # BLD AUTO: 153 K/UL — SIGNIFICANT CHANGE UP (ref 150–400)
POTASSIUM SERPL-MCNC: 4 MMOL/L — SIGNIFICANT CHANGE UP (ref 3.5–5.3)
POTASSIUM SERPL-SCNC: 4 MMOL/L — SIGNIFICANT CHANGE UP (ref 3.5–5.3)
RBC # BLD: 3.51 M/UL — LOW (ref 4.2–5.8)
RBC # FLD: 13 % — SIGNIFICANT CHANGE UP (ref 10.3–14.5)
SODIUM SERPL-SCNC: 140 MMOL/L — SIGNIFICANT CHANGE UP (ref 135–145)
WBC # BLD: 6.1 K/UL — SIGNIFICANT CHANGE UP (ref 3.8–10.5)
WBC # FLD AUTO: 6.1 K/UL — SIGNIFICANT CHANGE UP (ref 3.8–10.5)

## 2022-10-22 PROCEDURE — 99233 SBSQ HOSP IP/OBS HIGH 50: CPT

## 2022-10-22 RX ORDER — INFLUENZA VIRUS VACCINE 15; 15; 15; 15 UG/.5ML; UG/.5ML; UG/.5ML; UG/.5ML
0.7 SUSPENSION INTRAMUSCULAR ONCE
Refills: 0 | Status: DISCONTINUED | OUTPATIENT
Start: 2022-10-22 | End: 2022-10-26

## 2022-10-22 RX ADMIN — PANTOPRAZOLE SODIUM 40 MILLIGRAM(S): 20 TABLET, DELAYED RELEASE ORAL at 17:25

## 2022-10-22 RX ADMIN — SODIUM CHLORIDE 120 MILLILITER(S): 9 INJECTION, SOLUTION INTRAVENOUS at 21:19

## 2022-10-22 RX ADMIN — PANTOPRAZOLE SODIUM 40 MILLIGRAM(S): 20 TABLET, DELAYED RELEASE ORAL at 05:39

## 2022-10-22 RX ADMIN — SODIUM CHLORIDE 120 MILLILITER(S): 9 INJECTION, SOLUTION INTRAVENOUS at 05:40

## 2022-10-22 NOTE — PROGRESS NOTE ADULT - ASSESSMENT
SBO r/o bowel ischemia  - CT abdomen and pelvis showed SBO and possible bowel ischemia   - NPO, FS q 6 hrs while NPO   - Gentle IVF hydration   - Please send Bcx, d dimer  - F/u CXR official read and urine cx  - Consider starting empiric broad spectrum antibiotics   - Management as per surgery    Acute encephalopathy  - Patient with acute encephalopathy - confused, poor historian   - Toxic vs Metabolic vs Infectious vs dementia (unknown if at baseline)  - Consider CT head if worsened mental status or confusion  - NPO for now, hold all the oral medications   - Monitor for mental status changes   - Please send Bcx   - F/u CXR official read and urine cx  - Consider starting empiric broad spectrum antibiotics   - Fall precautions/Aspiration precautions/Seizure precautions    PRETTY   - On admission, patient with Cr 1.38, unknown baseline   - PRETTY most likely pre renal in the setting of acute abdominal pain   - Send urine lytes (urine sodium, urine creatinine, urine urea)  - Monitor I/O's daily  - Avoid nephrotoxins, NSAIDS, ACEI and ARBS  - Start IVF    - Monitor BMP daily    Elevated Glucose  - Patient with unknown hx of DM  - Please send A1c  - Monitor FS  - Consider starting sliding scale if BS >200  - Target -180    Elevated blood pressure readings   - Patient with unknown hx of hypertension  - BP elevation may be in the setting of pain vs undiagnosed hypertension   - DASH diet once not NPO   - Monitor BP and consider starting Amlodipine 5 mg daily or home meds if on any antihypertensive   D/D:    SBO r/o bowel ischemia  - CT abdomen and pelvis showed SBO and possible bowel ischemia   -d/w Dr. Cohen, watchful waiting for 1-2 days if no resolution may need Sx intervention  - Continue NPO, FS q 6 hrs while NPO   - Gentle IVF hydration may cut back to 100 cc/hr     -Monitor electrolytes closely and replace as needed; Keep K more than 4.0  - Rest of Management as per surgery    Enteritis on CT  - Please send Bcx, if spikes fever  - If spikes fever or leucocytosis consider starting empiric broad spectrum antibiotics     Acute metabolic encephalopathy on admission appear resolved  -Dehydration may have contributed   - Consider CT head if worsened mental status or confusion  - NPO for now, hold all the oral medications   - Monitor for mental status changes      - Fall precautions/Aspiration precautions/Seizure precautions    PRETTY   - On admission, patient with Cr 1.38 trending down  - PRETTY most likely pre renal in the setting of dehydration from GI losses  - Monitor I/O's daily  - Avoid nephrotoxins, NSAIDS, ACEI and ARBS  - Continue IVF    - Monitor BMP daily    Elevated Glucose  - Patient with unknown hx of DM  - Please send A1c  - Monitor FS  - Consider starting sliding scale if BS >200  - Target -180    Elevated blood pressure readings   - Patient with unknown hx of hypertension  - BP elevation likely in the setting of pain and dehydration; BP well controlled today  - DASH diet once not NPO   - Monitor BP and consider starting Amlodipine 5 mg daily if SBP more [id number] mm Hg    DVT ppx and GI PPX with PPI recommended    Discussed with Patient findings and plan of care  Discussed with Sx MARÍA Chacko and Attending Dr. Cohen

## 2022-10-22 NOTE — PROGRESS NOTE ADULT - NS ATTEND AMEND GEN_ALL_CORE FT
Adhesive SBO, CT concerning with bowel thickening and free fluid. The patient reports no abdominal pain today, he is comfortable, no tenderness on exam. NG tube fell out. No N/V. No bowel function.    Plan to continue initial non-operative management.   Surgery within 48 hours if no resolution.

## 2022-10-22 NOTE — PROGRESS NOTE ADULT - SUBJECTIVE AND OBJECTIVE BOX
INTERVAL HPI/OVERNIGHT EVENTS:  Pt seen and examined at bedside.   Pt resting comfortably. Seems confused but offers no acute complaints.   NGT self d/c'd overnight? pt denies, no NGT in place upon examination   NPO  Denies flatus/BM.   Denies N/V, denies abdominal pain     MEDICATIONS  (STANDING):  dextrose 5% + sodium chloride 0.45%. 1000 milliLiter(s) (120 mL/Hr) IV Continuous <Continuous>  influenza  Vaccine (HIGH DOSE) 0.7 milliLiter(s) IntraMuscular once  ondansetron Injectable 4 milliGRAM(s) IV Push once  pantoprazole  Injectable 40 milliGRAM(s) IV Push two times a day    MEDICATIONS  (PRN):  ondansetron Injectable 4 milliGRAM(s) IV Push every 6 hours PRN Nausea and/or Vomiting      Vital Signs Last 24 Hrs  T(C): 36.7 (22 Oct 2022 05:20), Max: 36.9 (21 Oct 2022 10:11)  T(F): 98.1 (22 Oct 2022 05:20), Max: 98.5 (21 Oct 2022 10:11)  HR: 72 (22 Oct 2022 05:20) (72 - 86)  BP: 113/45 (22 Oct 2022 05:20) (113/45 - 165/78)  BP(mean): --  RR: 18 (22 Oct 2022 05:20) (18 - 20)  SpO2: 98% (22 Oct 2022 05:20) (97% - 98%)    Parameters below as of 22 Oct 2022 05:20  Patient On (Oxygen Delivery Method): room air        Physical:  General: NAD  Respirations: Unlabored   Abdomen: Soft nondistended, nontender. No rebound, no guarding, no peritonitis     I&O's Detail      LABS:                        11.3   6.10  )-----------( 153      ( 22 Oct 2022 06:06 )             34.1             10-22    140  |  107  |  17  ----------------------------<  193<H>  4.0   |  26  |  1.20    Ca    8.6      22 Oct 2022 06:06    TPro  7.2  /  Alb  3.3<L>  /  TBili  0.8  /  DBili  x   /  AST  13  /  ALT  23  /  AlkPhos  95  10-21

## 2022-10-22 NOTE — PATIENT PROFILE ADULT - FUNCTIONAL ASSESSMENT - DAILY ACTIVITY SECTION LABEL
Awake, alert. Denies discomfort. Vss. On tele and pox, no alarms. Pressure dsg removed, cath site dry and intact. Pulses +2, cap refill good. Mechanical valve sounds and murmur noted. Good po. Will d/c to home   .

## 2022-10-22 NOTE — PROGRESS NOTE ADULT - ASSESSMENT
87 yoM with SBO r/o bowel ischemia    afeb, vss; no leukocytosis, lactate normal  NGT self d/c'd overnight     -NPO, IVF  -Serial abdominal exams   -PPI  -[facility] f/u   -Venodynes   -Discussed with Dr. Cohen

## 2022-10-22 NOTE — PROGRESS NOTE ADULT - SUBJECTIVE AND OBJECTIVE BOX
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 Admitted to surgery for SBO r/o bowel ischemia. Medicine consulted for co-management of comorbidities.    Patient doping better; self d/sandhya NGT.     Vital Signs Last 24 Hrs  T(C): 36.7 (22 Oct 2022 05:20), Max: 36.8 (21 Oct 2022 15:47)  T(F): 98.1 (22 Oct 2022 05:20), Max: 98.3 (21 Oct 2022 15:47)  HR: 72 (22 Oct 2022 05:20) (72 - 86)  BP: 113/45 (22 Oct 2022 05:20) (113/45 - 165/78)  RR: 18 (22 Oct 2022 05:20) (18 - 20)  SpO2: 98% (22 Oct 2022 05:20) (97% - 98%) room air    P/E:  elderly male, comfortably resting in bed, NAD at rest  Psych: AAO x2.5  Neuro: No gross focal deficits; Power and sensation intact  CVS: S1S2 present, regular, no edema  Resp: BLAE+, No wheeze or Rhonchi  GI: Soft, BS sluggish, Non tender, mildly distended  Extr: No  calf tenderness B/L Lower extremities  Skin: Warm and moist without any rashes    Labs:                        11.3   6.10  )-----------( 153      ( 22 Oct 2022 06:06 )             34.1   10-22    140  |  107  |  17  ----------------------------<  193<H>  4.0   |  26  |  1.20    Ca    8.6      22 Oct 2022 06:06    TPro  7.2  /  Alb  3.3<L>  /  TBili  0.8  /  DBili  x   /  AST  13  /  ALT  23  /  AlkPhos  95  10-21     CT Abdomen and Pelvis w/ IV Cont (10.21.22 @ 14:49)     IMPRESSION:  Distended small bowel with transition seen in the right side of the abdomen where there is a long segment of thick-walled small bowel with adjacent mild ascites. This may be related to enteritis from bowel ischemia. Small bowel obstruction with an inflamed segment of small bowel may be within the differential diagnosis as well as enteritis from infectious or inflammatory etiologies. Further evaluation is warranted.           Patient seen and examined this morning.    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 Admitted to surgery for SBO r/o bowel ischemia.     Medicine consulted for co-management of comorbidities.  As per patient lives with wife who is wheelchair bound. Has a dughter caring for her whilehe is in hospital.    Patient doing better; self d/sandhya NGT. Not in distress. No abdominal pain  denies fever, chills, SOB, palpitations, chest pain, nausea, vomiting,, dizziness    Vital Signs Last 24 Hrs  T(C): 36.7 (22 Oct 2022 05:20), Max: 36.8 (21 Oct 2022 15:47)  T(F): 98.1 (22 Oct 2022 05:20), Max: 98.3 (21 Oct 2022 15:47)  HR: 72 (22 Oct 2022 05:20) (72 - 86)  BP: 113/45 (22 Oct 2022 05:20) (113/45 - 165/78)  RR: 18 (22 Oct 2022 05:20) (18 - 20)  SpO2: 98% (22 Oct 2022 05:20) (97% - 98%) room air    P/E:  elderly male, comfortably resting in bed, NAD at rest  Psych: AAO x2.5  Neuro: No gross focal deficits; Power and sensation intact  CVS: S1S2 present, regular, no edema  Resp: BLAE+, No wheeze or Rhonchi  GI: Soft, BS very sluggish, Non tender, mildly distended  Extr: No  calf tenderness B/L Lower extremities  Skin: Warm and moist without any rashes    Labs:                      11.3   6.10  )-----------( 153      ( 22 Oct 2022 06:06 )             34.1   10-22    140  |  107  |  17  ----------------------------<  193<H>  4.0   |  26  |  1.20  Ca    8.6      22 Oct 2022 06:06    TPro  7.2  /  Alb  3.3<L>  /  TBili  0.8  /  DBili  x   /  AST  13  /  ALT  23  /  AlkPhos  95  10-21     CT Abdomen and Pelvis w/ IV Cont (10.21.22 @ 14:49)     IMPRESSION:  Distended small bowel with transition seen in the right side of the abdomen where there is a long segment of thick-walled small bowel with adjacent mild ascites. This may be related to enteritis from bowel ischemia. Small bowel obstruction with an inflamed segment of small bowel may be within the differential diagnosis as well as enteritis from infectious or inflammatory etiologies. Further evaluation is warranted.

## 2022-10-22 NOTE — PATIENT PROFILE ADULT - FALL HARM RISK - HARM RISK INTERVENTIONS

## 2022-10-23 LAB
ANION GAP SERPL CALC-SCNC: 5 MMOL/L — SIGNIFICANT CHANGE UP (ref 5–17)
BUN SERPL-MCNC: 12 MG/DL — SIGNIFICANT CHANGE UP (ref 7–18)
CALCIUM SERPL-MCNC: 8.3 MG/DL — LOW (ref 8.4–10.5)
CHLORIDE SERPL-SCNC: 108 MMOL/L — SIGNIFICANT CHANGE UP (ref 96–108)
CO2 SERPL-SCNC: 27 MMOL/L — SIGNIFICANT CHANGE UP (ref 22–31)
CREAT SERPL-MCNC: 1.3 MG/DL — SIGNIFICANT CHANGE UP (ref 0.5–1.3)
EGFR: 54 ML/MIN/1.73M2 — LOW
GLUCOSE SERPL-MCNC: 203 MG/DL — HIGH (ref 70–99)
HCT VFR BLD CALC: 32.1 % — LOW (ref 39–50)
HGB BLD-MCNC: 10.8 G/DL — LOW (ref 13–17)
MAGNESIUM SERPL-MCNC: 2 MG/DL — SIGNIFICANT CHANGE UP (ref 1.6–2.6)
MCHC RBC-ENTMCNC: 32.6 PG — SIGNIFICANT CHANGE UP (ref 27–34)
MCHC RBC-ENTMCNC: 33.6 GM/DL — SIGNIFICANT CHANGE UP (ref 32–36)
MCV RBC AUTO: 97 FL — SIGNIFICANT CHANGE UP (ref 80–100)
NRBC # BLD: 0 /100 WBCS — SIGNIFICANT CHANGE UP (ref 0–0)
PHOSPHATE SERPL-MCNC: 2.1 MG/DL — LOW (ref 2.5–4.5)
PLATELET # BLD AUTO: 152 K/UL — SIGNIFICANT CHANGE UP (ref 150–400)
POTASSIUM SERPL-MCNC: 3.8 MMOL/L — SIGNIFICANT CHANGE UP (ref 3.5–5.3)
POTASSIUM SERPL-SCNC: 3.8 MMOL/L — SIGNIFICANT CHANGE UP (ref 3.5–5.3)
RBC # BLD: 3.31 M/UL — LOW (ref 4.2–5.8)
RBC # FLD: 12.5 % — SIGNIFICANT CHANGE UP (ref 10.3–14.5)
SODIUM SERPL-SCNC: 140 MMOL/L — SIGNIFICANT CHANGE UP (ref 135–145)
WBC # BLD: 6.12 K/UL — SIGNIFICANT CHANGE UP (ref 3.8–10.5)
WBC # FLD AUTO: 6.12 K/UL — SIGNIFICANT CHANGE UP (ref 3.8–10.5)

## 2022-10-23 PROCEDURE — 99232 SBSQ HOSP IP/OBS MODERATE 35: CPT

## 2022-10-23 RX ORDER — POTASSIUM PHOSPHATE, MONOBASIC POTASSIUM PHOSPHATE, DIBASIC 236; 224 MG/ML; MG/ML
15 INJECTION, SOLUTION INTRAVENOUS ONCE
Refills: 0 | Status: COMPLETED | OUTPATIENT
Start: 2022-10-23 | End: 2022-10-23

## 2022-10-23 RX ADMIN — PANTOPRAZOLE SODIUM 40 MILLIGRAM(S): 20 TABLET, DELAYED RELEASE ORAL at 17:34

## 2022-10-23 RX ADMIN — SODIUM CHLORIDE 120 MILLILITER(S): 9 INJECTION, SOLUTION INTRAVENOUS at 12:22

## 2022-10-23 RX ADMIN — POTASSIUM PHOSPHATE, MONOBASIC POTASSIUM PHOSPHATE, DIBASIC 62.5 MILLIMOLE(S): 236; 224 INJECTION, SOLUTION INTRAVENOUS at 12:22

## 2022-10-23 RX ADMIN — PANTOPRAZOLE SODIUM 40 MILLIGRAM(S): 20 TABLET, DELAYED RELEASE ORAL at 06:56

## 2022-10-23 NOTE — PROGRESS NOTE ADULT - ASSESSMENT
87 yoM with SBO r/o bowel ischemia    afeb, vss; no leukocytosis, lactate normal  NGT self d/c'd     -NPO, IVF  -Serial abdominal exams  -NGT   -PPI  -[facility]CO f/u   -Venodynes   -Discussed with Dr. Cohen  87 yoM with SBO r/o bowel ischemia    afeb, vss; no leukocytosis, lactate normal  NGT self d/c'd     -NPO, IVF  -Serial abdominal exams  -Monitor/ replete electrolytes   -NGT   -PPI  -[facility] f/u   -Venodynes   -Discussed with Dr. Cohen

## 2022-10-23 NOTE — PROGRESS NOTE ADULT - SUBJECTIVE AND OBJECTIVE BOX
INTERVAL HPI/OVERNIGHT EVENTS:  Patient seen and examined at bedside   Pt resting comfortably. No acute complaints.   Patient NPO. Denies any N/V. Denies abdominal pain.   Denies Flatus/BM.   Patient denies chest pain, shortness of breath, fever, chills, urinary complaints or any other constitutional symptoms.       MEDICATIONS  (STANDING):  dextrose 5% + sodium chloride 0.45%. 1000 milliLiter(s) (120 mL/Hr) IV Continuous <Continuous>  influenza  Vaccine (HIGH DOSE) 0.7 milliLiter(s) IntraMuscular once  ondansetron Injectable 4 milliGRAM(s) IV Push once  pantoprazole  Injectable 40 milliGRAM(s) IV Push two times a day    MEDICATIONS  (PRN):  ondansetron Injectable 4 milliGRAM(s) IV Push every 6 hours PRN Nausea and/or Vomiting      Vital Signs Last 24 Hrs  T(C): 37.1 (23 Oct 2022 05:48), Max: 37.1 (23 Oct 2022 05:48)  T(F): 98.7 (23 Oct 2022 05:48), Max: 98.7 (23 Oct 2022 05:48)  HR: 70 (23 Oct 2022 05:48) (66 - 70)  BP: 116/58 (23 Oct 2022 05:48) (116/58 - 169/62)  BP(mean): 70 (23 Oct 2022 05:48) (70 - 80)  RR: 16 (23 Oct 2022 05:48) (16 - 18)  SpO2: 97% (23 Oct 2022 05:48) (96% - 98%)    Parameters below as of 23 Oct 2022 05:48  Patient On (Oxygen Delivery Method): nasal cannula  O2 Flow (L/min): 2      Physical:  General: A&Ox3. NAD.  Respiratory: non labored  Abdomen: soft, mild distended, nontender, no rebound, no guarding      I&O's Detail      LABS:                        10.8   6.12  )-----------( 152      ( 23 Oct 2022 07:30 )             32.1             10-23    140  |  108  |  12  ----------------------------<  203<H>  3.8   |  27  |  1.30    Ca    8.3<L>      23 Oct 2022 07:30  Phos  2.1     10-23  Mg     2.0     10-23    TPro  7.2  /  Alb  3.3<L>  /  TBili  0.8  /  DBili  x   /  AST  13  /  ALT  23  /  AlkPhos  95  10-21    PT/INR - ( 21 Oct 2022 11:09 )   PT: 16.4 sec;   INR: 1.37 ratio         PTT - ( 21 Oct 2022 11:09 )  PTT:28.2 sec

## 2022-10-24 LAB
-  AMIKACIN: SIGNIFICANT CHANGE UP
-  AMOXICILLIN/CLAVULANIC ACID: SIGNIFICANT CHANGE UP
-  AMPICILLIN/SULBACTAM: SIGNIFICANT CHANGE UP
-  AMPICILLIN: SIGNIFICANT CHANGE UP
-  AZTREONAM: SIGNIFICANT CHANGE UP
-  CEFAZOLIN: SIGNIFICANT CHANGE UP
-  CEFEPIME: SIGNIFICANT CHANGE UP
-  CEFOXITIN: SIGNIFICANT CHANGE UP
-  CEFTRIAXONE: SIGNIFICANT CHANGE UP
-  CIPROFLOXACIN: SIGNIFICANT CHANGE UP
-  ERTAPENEM: SIGNIFICANT CHANGE UP
-  GENTAMICIN: SIGNIFICANT CHANGE UP
-  IMIPENEM: SIGNIFICANT CHANGE UP
-  LEVOFLOXACIN: SIGNIFICANT CHANGE UP
-  MEROPENEM: SIGNIFICANT CHANGE UP
-  NITROFURANTOIN: SIGNIFICANT CHANGE UP
-  PIPERACILLIN/TAZOBACTAM: SIGNIFICANT CHANGE UP
-  TIGECYCLINE: SIGNIFICANT CHANGE UP
-  TOBRAMYCIN: SIGNIFICANT CHANGE UP
-  TRIMETHOPRIM/SULFAMETHOXAZOLE: SIGNIFICANT CHANGE UP
ANION GAP SERPL CALC-SCNC: 8 MMOL/L — SIGNIFICANT CHANGE UP (ref 5–17)
BASOPHILS # BLD AUTO: 0.03 K/UL — SIGNIFICANT CHANGE UP (ref 0–0.2)
BASOPHILS NFR BLD AUTO: 0.5 % — SIGNIFICANT CHANGE UP (ref 0–2)
BUN SERPL-MCNC: 9 MG/DL — SIGNIFICANT CHANGE UP (ref 7–18)
CALCIUM SERPL-MCNC: 8.1 MG/DL — LOW (ref 8.4–10.5)
CHLORIDE SERPL-SCNC: 110 MMOL/L — HIGH (ref 96–108)
CO2 SERPL-SCNC: 26 MMOL/L — SIGNIFICANT CHANGE UP (ref 22–31)
CREAT SERPL-MCNC: 1.27 MG/DL — SIGNIFICANT CHANGE UP (ref 0.5–1.3)
CULTURE RESULTS: SIGNIFICANT CHANGE UP
EGFR: 55 ML/MIN/1.73M2 — LOW
EOSINOPHIL # BLD AUTO: 0.29 K/UL — SIGNIFICANT CHANGE UP (ref 0–0.5)
EOSINOPHIL NFR BLD AUTO: 5.3 % — SIGNIFICANT CHANGE UP (ref 0–6)
GLUCOSE SERPL-MCNC: 195 MG/DL — HIGH (ref 70–99)
HCT VFR BLD CALC: 31.3 % — LOW (ref 39–50)
HGB BLD-MCNC: 10.4 G/DL — LOW (ref 13–17)
IMM GRANULOCYTES NFR BLD AUTO: 0.9 % — SIGNIFICANT CHANGE UP (ref 0–0.9)
LYMPHOCYTES # BLD AUTO: 0.89 K/UL — LOW (ref 1–3.3)
LYMPHOCYTES # BLD AUTO: 16.3 % — SIGNIFICANT CHANGE UP (ref 13–44)
MAGNESIUM SERPL-MCNC: 1.9 MG/DL — SIGNIFICANT CHANGE UP (ref 1.6–2.6)
MCHC RBC-ENTMCNC: 32 PG — SIGNIFICANT CHANGE UP (ref 27–34)
MCHC RBC-ENTMCNC: 33.2 GM/DL — SIGNIFICANT CHANGE UP (ref 32–36)
MCV RBC AUTO: 96.3 FL — SIGNIFICANT CHANGE UP (ref 80–100)
METHOD TYPE: SIGNIFICANT CHANGE UP
MONOCYTES # BLD AUTO: 0.57 K/UL — SIGNIFICANT CHANGE UP (ref 0–0.9)
MONOCYTES NFR BLD AUTO: 10.4 % — SIGNIFICANT CHANGE UP (ref 2–14)
NEUTROPHILS # BLD AUTO: 3.63 K/UL — SIGNIFICANT CHANGE UP (ref 1.8–7.4)
NEUTROPHILS NFR BLD AUTO: 66.6 % — SIGNIFICANT CHANGE UP (ref 43–77)
NRBC # BLD: 0 /100 WBCS — SIGNIFICANT CHANGE UP (ref 0–0)
ORGANISM # SPEC MICROSCOPIC CNT: SIGNIFICANT CHANGE UP
ORGANISM # SPEC MICROSCOPIC CNT: SIGNIFICANT CHANGE UP
PHOSPHATE SERPL-MCNC: 3.2 MG/DL — SIGNIFICANT CHANGE UP (ref 2.5–4.5)
PLATELET # BLD AUTO: 164 K/UL — SIGNIFICANT CHANGE UP (ref 150–400)
POTASSIUM SERPL-MCNC: 3.7 MMOL/L — SIGNIFICANT CHANGE UP (ref 3.5–5.3)
POTASSIUM SERPL-SCNC: 3.7 MMOL/L — SIGNIFICANT CHANGE UP (ref 3.5–5.3)
RBC # BLD: 3.25 M/UL — LOW (ref 4.2–5.8)
RBC # FLD: 12.6 % — SIGNIFICANT CHANGE UP (ref 10.3–14.5)
SODIUM SERPL-SCNC: 144 MMOL/L — SIGNIFICANT CHANGE UP (ref 135–145)
SPECIMEN SOURCE: SIGNIFICANT CHANGE UP
WBC # BLD: 5.46 K/UL — SIGNIFICANT CHANGE UP (ref 3.8–10.5)
WBC # FLD AUTO: 5.46 K/UL — SIGNIFICANT CHANGE UP (ref 3.8–10.5)

## 2022-10-24 PROCEDURE — 99232 SBSQ HOSP IP/OBS MODERATE 35: CPT

## 2022-10-24 RX ADMIN — PANTOPRAZOLE SODIUM 40 MILLIGRAM(S): 20 TABLET, DELAYED RELEASE ORAL at 05:20

## 2022-10-24 RX ADMIN — PANTOPRAZOLE SODIUM 40 MILLIGRAM(S): 20 TABLET, DELAYED RELEASE ORAL at 17:25

## 2022-10-24 NOTE — DIETITIAN INITIAL EVALUATION ADULT - OTHER INFO
Pt visited. Reports UBW @175#. Preference for "no pork" relayed to kitchen. Pt (and RN) report pt taking clear liquids

## 2022-10-24 NOTE — PROGRESS NOTE ADULT - ASSESSMENT
86 y/o male with SBO r/o bowel ischemia  VSS, afebrile, electrolytes WNL    Plan  -advance diet to clears as tolerated  -pain control prn   -continue SCD ppx  -continue incentive spirometry  -encourage ambulation   Pharmacy Name:      Pharmacy representative name: [facility]    Pharmacy representative phone number: [phone]    What medication are you calling in regards to: STRIPS    Who is the provider that prescribed the medication: LIZZETH WEEKS    Additional notes: ACCU CHECK  IS NOT COVERED SO THERE PATIENT IS OK WITH THE STRIPS THAT ARE COVERED.  REFERENCE NUMBER [id number]

## 2022-10-24 NOTE — PROGRESS NOTE ADULT - SUBJECTIVE AND OBJECTIVE BOX
INTERVAL HPI/OVERNIGHT EVENTS:  Pt resting comfortably. No acute events overnight. Admits to improving pain following NPO. Admits to flatus. Ambulating with walker and personal assistance. Denies bowel movement, nausea, vomiting.       MEDICATIONS  (STANDING):  dextrose 5% + sodium chloride 0.45%. 1000 milliLiter(s) (120 mL/Hr) IV Continuous <Continuous>  influenza  Vaccine (HIGH DOSE) 0.7 milliLiter(s) IntraMuscular once  ondansetron Injectable 4 milliGRAM(s) IV Push once  pantoprazole  Injectable 40 milliGRAM(s) IV Push two times a day    MEDICATIONS  (PRN):  ondansetron Injectable 4 milliGRAM(s) IV Push every 6 hours PRN Nausea and/or Vomiting      Vital Signs Last 24 Hrs  T(C): 37.3 (24 Oct 2022 05:26), Max: 37.3 (24 Oct 2022 05:26)  T(F): 99.1 (24 Oct 2022 05:26), Max: 99.1 (24 Oct 2022 05:26)  HR: 70 (24 Oct 2022 05:26) (64 - 70)  BP: 104/53 (24 Oct 2022 05:26) (104/53 - 130/79)  BP(mean): --  RR: 15 (24 Oct 2022 05:26) (15 - 16)  SpO2: 96% (24 Oct 2022 05:26) (96% - 96%)    Parameters below as of 24 Oct 2022 05:26  Patient On (Oxygen Delivery Method): room air        Physical:  General: A&Ox3. NAD.  Chest: Unlabored breathing. Equal chest rise bilaterally.   Abdomen: Soft nondistended, tender to palpation.  Extremities: No calf tenderness.     I&O's Detail      LABS:                        10.4   5.46  )-----------( 164      ( 24 Oct 2022 05:21 )             31.3             10-24    144  |  110<H>  |  9   ----------------------------<  195<H>  3.7   |  26  |  1.27    Ca    8.1<L>      24 Oct 2022 05:21  Phos  3.2     10-24  Mg     1.9     10-24

## 2022-10-24 NOTE — PROGRESS NOTE ADULT - SUBJECTIVE AND OBJECTIVE BOX
Patient seen and examined this morning.    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 Admitted to surgery for SBO r/o bowel ischemia.     Medicine consulted for co-management of comorbidities.  As per patient lives with wife who is wheelchair bound. Has a dughter caring for her whilehe is in hospital.    Patient doing better; self d/sandhya NGT. Not in distress. No abdominal pain  denies fever, chills, SOB, palpitations, chest pain, nausea, vomiting,, dizziness        P/E:  elderly male, comfortably resting in bed, NAD at rest  Psych: AAO x2.5  Neuro: No gross focal deficits; Power and sensation intact  CVS: S1S2 present, regular, no edema  Resp: BLAE+, No wheeze or Rhonchi  GI: Soft, BS ++ improved all four quadrants Non tender, mildly distended  Extr: No  calf tenderness B/L Lower extremities  Skin: Warm and moist without any rashes    Labs: Reviewed; stable     CT Abdomen and Pelvis w/ IV Cont (10.21.22 @ 14:49)     IMPRESSION:  Distended small bowel with transition seen in the right side of the abdomen where there is a long segment of thick-walled small bowel with adjacent mild ascites. This may be related to enteritis from bowel ischemia. Small bowel obstruction with an inflamed segment of small bowel may be within the differential diagnosis as well as enteritis from infectious or inflammatory etiologies. Further evaluation is warranted.           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 Admitted to surgery for SBO r/o bowel ischemia.     Medicine consulted for co-management of comorbidities.  As per patient lives with wife who is wheelchair bound. Has a dughter caring for her while he is in hospital.    Patient doing better; OFF ngt FOR 24 HRS;  Not in distress. No abdominal pain; Passing flatus but no BM  denies fever, chills, SOB, palpitations, chest pain, nausea, vomiting, dizziness    Vital Signs Last 24 Hrs  T(C): 37.1 (23 Oct 2022 05:48), Max: 37.1 (23 Oct 2022 05:48)  T(F): 98.7 (23 Oct 2022 05:48), Max: 98.7 (23 Oct 2022 05:48)  HR: 70 (23 Oct 2022 05:48) (66 - 70)  BP: 116/58 (23 Oct 2022 05:48) (116/58 - 169/62)  BP(mean): 70 (23 Oct 2022 05:48) (70 - 80)  RR: 16 (23 Oct 2022 05:48) (16 - 18)  SpO2: 97% (23 Oct 2022 05:48) (96% - 98%)    P/E:  elderly male, comfortably resting in bed, NAD at rest  Psych: AAO x2.5  Neuro: No gross focal deficits; Power and sensation intact  CVS: S1S2 present, regular, no edema  Resp: BLAE+, No wheeze or Rhonchi  GI: Soft, BS ++ improved all four quadrants Non tender, mildly distended  Extr: No  calf tenderness B/L Lower extremities  Skin: Warm and moist without any rashes    Labs: Reviewed; stable     CT Abdomen and Pelvis w/ IV Cont (10.21.22 @ 14:49)     IMPRESSION:  Distended small bowel with transition seen in the right side of the abdomen where there is a long segment of thick-walled small bowel with adjacent mild ascites. This may be related to enteritis from bowel ischemia. Small bowel obstruction with an inflamed segment of small bowel may be within the differential diagnosis as well as enteritis from infectious or inflammatory etiologies. Further evaluation is warranted.           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 Admitted to surgery for SBO r/o bowel ischemia.     Medicine consulted for co-management of comorbidities.  As per patient lives with wife who is wheelchair bound. Has a dughter caring for her while he is in hospital.    Patient doing better; OFF ngt FOR 24 HRS;  Not in distress. No abdominal pain; Passing flatus but no BM  denies fever, chills, SOB, palpitations, chest pain, nausea, vomiting, dizziness    Vital Signs Last 24 Hrs  T(C): 37.1 (23 Oct 2022 05:48), Max: 37.1 (23 Oct 2022 05:48)  T(F): 98.7 (23 Oct 2022 05:48), Max: 98.7 (23 Oct 2022 05:48)  HR: 70 (23 Oct 2022 05:48) (66 - 70)  BP: 116/58 (23 Oct 2022 05:48) (116/58 - 169/62)  BP(mean): 70 (23 Oct 2022 05:48) (70 - 80)  RR: 16 (23 Oct 2022 05:48) (16 - 18)  SpO2: 97% (23 Oct 2022 05:48) (96% - 98%)    P/E:  elderly male, comfortably resting in bed, NAD at rest  Psych: AAO x2.5  Neuro: No gross focal deficits; Power and sensation intact  CVS: S1S2 present, regular, no edema  Resp: BLAE+, No wheeze or Rhonchi  GI: Soft, BS ++ improved all four quadrants Non tender, mildly distended  Extr: No  calf tenderness B/L Lower extremities  Skin: Warm and moist without any rashes    Labs: Reviewed; stable             10.8   6.12  )-----------( 152      ( 23 Oct 2022 07:30 )             32.1             10-23    140  |  108  |  12  ----------------------------<  203<H>  3.8   |  27  |  1.30    Ca    8.3<L>      23 Oct 2022 07:30  Phos  2.1     10-23  Mg     2.0     10-23     CT Abdomen and Pelvis w/ IV Cont (10.21.22 @ 14:49)     IMPRESSION:  Distended small bowel with transition seen in the right side of the abdomen where there is a long segment of thick-walled small bowel with adjacent mild ascites. This may be related to enteritis from bowel ischemia. Small bowel obstruction with an inflamed segment of small bowel may be within the differential diagnosis as well as enteritis from infectious or inflammatory etiologies. Further evaluation is warranted.

## 2022-10-24 NOTE — DIETITIAN INITIAL EVALUATION ADULT - PERTINENT MEDS FT
MEDICATIONS  (STANDING):  dextrose 5% + sodium chloride 0.45%. 1000 milliLiter(s) (120 mL/Hr) IV Continuous <Continuous>  influenza  Vaccine (HIGH DOSE) 0.7 milliLiter(s) IntraMuscular once  ondansetron Injectable 4 milliGRAM(s) IV Push once  pantoprazole  Injectable 40 milliGRAM(s) IV Push two times a day    MEDICATIONS  (PRN):  ondansetron Injectable 4 milliGRAM(s) IV Push every 6 hours PRN Nausea and/or Vomiting

## 2022-10-24 NOTE — PROGRESS NOTE ADULT - ASSESSMENT
D/D:    SBO r/o bowel ischemia  - CT abdomen and pelvis showed SBO and possible bowel ischemia ; clinically improving  -d/w Dr. Cohen, watchful waiting for 1-2 days if no resolution may need Sx intervention  - Continue NPO, FS q 6 hrs while NPO   - Gentle IVF hydration may cut back to 100 cc/hr     -Monitor electrolytes closely and replace as needed; Keep K more than 4.0  - Rest of Management as per surgery    Enteritis on CT  - Please send Bcx, if spikes fever  - If spikes fever or leucocytosis consider starting empiric broad spectrum antibiotics     Acute metabolic encephalopathy on admission appear resolved  -Dehydration may have contributed   - Consider CT head if worsened mental status or confusion  - NPO for now, hold all the oral medications   - Monitor for mental status changes      - Fall precautions/Aspiration precautions/Seizure precautions    PRETTY   - On admission, patient with Cr 1.38 trending down  - PRETTY most likely pre renal in the setting of dehydration from GI losses  - Monitor I/O's daily  - Avoid nephrotoxins, NSAIDS, ACEI and ARBS  - Continue IVF    - Monitor BMP daily    Elevated Glucose  - Patient with unknown hx of DM  - Please send A1c  - Monitor FS  - Consider starting sliding scale if BS >200  - Target -180    Elevated blood pressure readings   - Patient with unknown hx of hypertension  - BP elevation likely in the setting of pain and dehydration; BP well controlled today  - DASH diet once not NPO   - Monitor BP and consider starting Amlodipine 5 mg daily if SBP more [id number] mm Hg    DVT ppx and GI PPX with PPI recommended    Discussed with Patient findings and plan of care  Discussed with Sx MARÍA Chacko and Attending Dr. Cohen  D/D:    SBO r/o bowel ischemia  - CT abdomen and pelvis showed SBO and possible bowel ischemia ; clinically improving  -d/w Dr. Cohen, watchful waiting for 1-2 days if no resolution may need Sx intervention  - Continue NPO, FS q 6 hrs while NPO   - Gentle IVF hydration may cut back to 100 cc/hr     -Monitor electrolytes closely and replace as needed; Keep K more than 4.0  - Rest of Management as per surgery    Enteritis on CT  - Please send Bcx, if spikes fever  - If spikes fever or leucocytosis consider starting empiric broad spectrum antibiotics     Acute metabolic encephalopathy on admission appear resolved  -Dehydration may have contributed   - Consider CT head if worsened mental status or confusion  - NPO for now, hold all the oral medications   - Monitor for mental status changes      - Fall precautions/Aspiration precautions/Seizure precautions    PRETTY   - On admission, patient with Cr 1.38 trending down  - PRETTY most likely pre renal in the setting of dehydration from GI losses  - Monitor I/O's daily  - Avoid nephrotoxins, NSAIDS, ACEI and ARBS  - Continue IVF    - Monitor BMP daily    Elevated Glucose  - Patient with unknown hx of DM; F/U A1c please may ADD ON  - Monitor FS  - Consider starting sliding scale if BS >200  - Target -180    Elevated blood pressure readings   - Patient with unknown hx of hypertension  - BP elevation likely in the setting of pain and dehydration; BP well controlled last 2 days  - DASH diet once not NPO   - Monitor BP and consider starting Amlodipine 5 mg daily if SBP more [id number] mm Hg    DVT ppx and GI PPX with PPI recommended    Discussed with Patient findings and plan of care  Discussed with Attending Dr. Cohen

## 2022-10-24 NOTE — DIETITIAN INITIAL EVALUATION ADULT - PERTINENT LABORATORY DATA
10-24    144  |  110<H>  |  9   ----------------------------<  195<H>  3.7   |  26  |  1.27    Ca    8.1<L>      24 Oct 2022 05:21  Phos  3.2     10-24  Mg     1.9     10-24

## 2022-10-25 LAB
ANION GAP SERPL CALC-SCNC: 7 MMOL/L — SIGNIFICANT CHANGE UP (ref 5–17)
BUN SERPL-MCNC: 6 MG/DL — LOW (ref 7–18)
CALCIUM SERPL-MCNC: 8.8 MG/DL — SIGNIFICANT CHANGE UP (ref 8.4–10.5)
CHLORIDE SERPL-SCNC: 110 MMOL/L — HIGH (ref 96–108)
CO2 SERPL-SCNC: 26 MMOL/L — SIGNIFICANT CHANGE UP (ref 22–31)
CREAT SERPL-MCNC: 1.24 MG/DL — SIGNIFICANT CHANGE UP (ref 0.5–1.3)
EGFR: 57 ML/MIN/1.73M2 — LOW
GLUCOSE BLDC GLUCOMTR-MCNC: 169 MG/DL — HIGH (ref 70–99)
GLUCOSE SERPL-MCNC: 184 MG/DL — HIGH (ref 70–99)
HCT VFR BLD CALC: 33.7 % — LOW (ref 39–50)
HGB BLD-MCNC: 11.7 G/DL — LOW (ref 13–17)
MCHC RBC-ENTMCNC: 32.5 PG — SIGNIFICANT CHANGE UP (ref 27–34)
MCHC RBC-ENTMCNC: 34.7 GM/DL — SIGNIFICANT CHANGE UP (ref 32–36)
MCV RBC AUTO: 93.6 FL — SIGNIFICANT CHANGE UP (ref 80–100)
NRBC # BLD: 0 /100 WBCS — SIGNIFICANT CHANGE UP (ref 0–0)
PLATELET # BLD AUTO: 197 K/UL — SIGNIFICANT CHANGE UP (ref 150–400)
POTASSIUM SERPL-MCNC: 3.7 MMOL/L — SIGNIFICANT CHANGE UP (ref 3.5–5.3)
POTASSIUM SERPL-SCNC: 3.7 MMOL/L — SIGNIFICANT CHANGE UP (ref 3.5–5.3)
RBC # BLD: 3.6 M/UL — LOW (ref 4.2–5.8)
RBC # FLD: 12.8 % — SIGNIFICANT CHANGE UP (ref 10.3–14.5)
SODIUM SERPL-SCNC: 143 MMOL/L — SIGNIFICANT CHANGE UP (ref 135–145)
WBC # BLD: 5.79 K/UL — SIGNIFICANT CHANGE UP (ref 3.8–10.5)
WBC # FLD AUTO: 5.79 K/UL — SIGNIFICANT CHANGE UP (ref 3.8–10.5)

## 2022-10-25 PROCEDURE — 99233 SBSQ HOSP IP/OBS HIGH 50: CPT

## 2022-10-25 RX ORDER — SODIUM CHLORIDE 9 MG/ML
1000 INJECTION, SOLUTION INTRAVENOUS
Refills: 0 | Status: DISCONTINUED | OUTPATIENT
Start: 2022-10-25 | End: 2022-10-26

## 2022-10-25 RX ORDER — INSULIN LISPRO 100/ML
VIAL (ML) SUBCUTANEOUS AT BEDTIME
Refills: 0 | Status: DISCONTINUED | OUTPATIENT
Start: 2022-10-25 | End: 2022-10-26

## 2022-10-25 RX ORDER — DEXTROSE 50 % IN WATER 50 %
12.5 SYRINGE (ML) INTRAVENOUS ONCE
Refills: 0 | Status: DISCONTINUED | OUTPATIENT
Start: 2022-10-25 | End: 2022-10-26

## 2022-10-25 RX ORDER — ENOXAPARIN SODIUM 100 MG/ML
40 INJECTION SUBCUTANEOUS EVERY 24 HOURS
Refills: 0 | Status: DISCONTINUED | OUTPATIENT
Start: 2022-10-25 | End: 2022-10-26

## 2022-10-25 RX ORDER — INSULIN LISPRO 100/ML
VIAL (ML) SUBCUTANEOUS
Refills: 0 | Status: DISCONTINUED | OUTPATIENT
Start: 2022-10-25 | End: 2022-10-26

## 2022-10-25 RX ORDER — GLUCAGON INJECTION, SOLUTION 0.5 MG/.1ML
1 INJECTION, SOLUTION SUBCUTANEOUS ONCE
Refills: 0 | Status: DISCONTINUED | OUTPATIENT
Start: 2022-10-25 | End: 2022-10-26

## 2022-10-25 RX ORDER — DEXTROSE 50 % IN WATER 50 %
25 SYRINGE (ML) INTRAVENOUS ONCE
Refills: 0 | Status: DISCONTINUED | OUTPATIENT
Start: 2022-10-25 | End: 2022-10-26

## 2022-10-25 RX ORDER — DEXTROSE 50 % IN WATER 50 %
15 SYRINGE (ML) INTRAVENOUS ONCE
Refills: 0 | Status: DISCONTINUED | OUTPATIENT
Start: 2022-10-25 | End: 2022-10-26

## 2022-10-25 RX ADMIN — ENOXAPARIN SODIUM 40 MILLIGRAM(S): 100 INJECTION SUBCUTANEOUS at 21:33

## 2022-10-25 RX ADMIN — PANTOPRAZOLE SODIUM 40 MILLIGRAM(S): 20 TABLET, DELAYED RELEASE ORAL at 05:07

## 2022-10-25 RX ADMIN — PANTOPRAZOLE SODIUM 40 MILLIGRAM(S): 20 TABLET, DELAYED RELEASE ORAL at 18:00

## 2022-10-25 NOTE — PROGRESS NOTE ADULT - ASSESSMENT
D/D:    SBO r/o bowel ischemia  - CT abdomen and pelvis showed SBO and possible bowel ischemia ; clinically improving an  -d/w Dr. Cohen; monitor on clears for now; diet advance as per Sx if diet tolertaed  - Gentle IVF hydration until tolertae diet     -Monitor electrolytes closely and replace as needed; Keep K more than 4.0  - Rest of Management as per surgery    Enteritis on CT  - Please send Bcx, if spikes fever  - If spikes fever or leucocytosis consider starting empiric broad spectrum antibiotics     Acute metabolic encephalopathy on admission appear resolved  -Dehydration may have contributed currently at baseline  - Consider CT head if worsened mental status or confusion  - NPO for now, hold all the oral medications   - Monitor for mental status changes      - Fall precautions/Aspiration precautions/Seizure precautions    PRETTY   - On admission, patient with Cr 1.38 trending down  - PRETTY most likely pre renal in the setting of dehydration from GI losses  - Monitor I/O's daily  - Avoid nephrotoxins, NSAIDS, ACEI and ARBS  - Continue IVF    - Monitor BMP daily    Elevated Glucose  - Patient with unknown hx of DM; F/U A1c please may ADD ON  - Monitor FS  - Consider starting sliding scale if BS >200  - Target -180    Elevated blood pressure readings   - Patient with unknown hx of hypertension  - BP elevation likely in the setting of pain and dehydration; BP well controlled last 2 days  - DASH diet once tolerate diet  - Monitor BP and consider starting Amlodipine 5 mg daily if SBP more [id number] mm Hg persistently    DVT ppx and GI PPX with PPI recommended    Ambulation encouraged to patient    Discussed with Patient findings and plan of care  Discussed with Attending Dr. Cohen  D/D:    SBO r/o bowel ischemia  - CT abdomen and pelvis showed SBO and possible bowel ischemia ; clinically improving an  -d/w Dr. Cohen; monitor on clears for now; diet advance as per Sx if diet tolertaed  - Gentle IVF hydration until tolertae diet     -Monitor electrolytes closely and replace as needed; Keep K more than 4.0  - Rest of Management as per surgery    Enteritis on CT  - Please send Bcx, if spikes fever  - If spikes fever or leucocytosis consider starting empiric broad spectrum antibiotics     Acute metabolic encephalopathy on admission appear resolved  -Dehydration may have contributed currently at baseline  - Consider CT head if worsened mental status or confusion  - NPO for now, hold all the oral medications   - Monitor for mental status changes      - Fall precautions/Aspiration precautions/Seizure precautions    PRETTY with  Prerenal Azotemia resolved; BUN 24>>6  - On admission, patient with Cr 1.38 trending down to 1.2  - PRETTY most likely pre renal in the setting of dehydration from GI losses  - Monitor I/O's daily  - Avoid nephrotoxins, NSAIDS, ACEI and ARBS  - Continue IVF    - Monitor BMP daily    Elevated Glucose  - Patient with unknown hx of DM; F/U A1c please may ADD ON  - Monitor FS  - Consider starting sliding scale if BS >200  - Target -180    Elevated blood pressure readings   - Patient with unknown hx of hypertension  - BP elevation likely in the setting of pain and dehydration; BP well controlled last 2 days  - DASH diet once tolerate diet  - Monitor BP and consider starting Amlodipine 5 mg daily if SBP more [id number] mm Hg persistently    DVT ppx and GI PPX with PPI recommended    Ambulation encouraged to patient    Discussed with Patient findings and plan of care  Discussed with Attending Dr. Cohen

## 2022-10-25 NOTE — PROGRESS NOTE ADULT - SUBJECTIVE AND OBJECTIVE BOX
Patient is a 87y old  Male who presents with a chief complaint of Hematemesis (25 Oct 2022 09:09)    Patient was seen and examined at bedside  Tolerated liquid diet  Had BM yesterday, no blood in stool, Abd distension improved, no nausea or vomiting    INTERVAL HPI/OVERNIGHT EVENTS:  T(C): 36.8 (10-25-22 @ 05:25), Max: 36.8 (10-24-22 @ 21:42)  HR: 70 (10-25-22 @ 05:25) (70 - 73)  BP: 122/58 (10-25-22 @ 05:25) (122/58 - 152/72)  RR: 16 (10-25-22 @ 05:25) (16 - 16)  SpO2: 97% (10-25-22 @ 05:25) (95% - 97%)  Wt(kg): --  I&O's Summary      REVIEW OF SYSTEMS: denies fever, chills, SOB, palpitations, chest pain, constipation, dizziness    MEDICATIONS  (STANDING):  dextrose 5% + sodium chloride 0.45%. 1000 milliLiter(s) (120 mL/Hr) IV Continuous <Continuous>  influenza  Vaccine (HIGH DOSE) 0.7 milliLiter(s) IntraMuscular once  ondansetron Injectable 4 milliGRAM(s) IV Push once  pantoprazole  Injectable 40 milliGRAM(s) IV Push two times a day    MEDICATIONS  (PRN):  ondansetron Injectable 4 milliGRAM(s) IV Push every 6 hours PRN Nausea and/or Vomiting      PHYSICAL EXAM:  GENERAL: NAD  HEAD:  Atraumatic, Normocephalic  NERVOUS SYSTEM:  Alert & Oriented X3, no focal deficit   CHEST/LUNG: Clear to auscultation bilaterally; No rales, rhonchi, wheezing, or rubs  HEART: Regular rate and rhythm; No murmurs, rubs, or gallops  ABDOMEN: Soft, Nontender, Nondistended; Bowel sounds present   EXTREMITIES:  2+ Peripheral Pulses, No clubbing, cyanosis, or edema  SKIN: No rashes or lesions    LABS:                        11.7   5.79  )-----------( 197      ( 25 Oct 2022 06:55 )             33.7     10-25    143  |  110<H>  |  6<L>  ----------------------------<  184<H>  3.7   |  26  |  1.24    Ca    8.8      25 Oct 2022 06:55  Phos  3.2     10-24  Mg     1.9     10-24          CAPILLARY BLOOD GLUCOSE

## 2022-10-25 NOTE — PROGRESS NOTE ADULT - SUBJECTIVE AND OBJECTIVE BOX
INTERVAL HPI/OVERNIGHT EVENTS:  Pt seen and examined at bedside.   Pt resting comfortably. No acute complaints.   Denies abdominal pain   Tolerating diet.   flatus/BM.   Denies N/V    MEDICATIONS  (STANDING):  dextrose 5% + sodium chloride 0.45%. 1000 milliLiter(s) (120 mL/Hr) IV Continuous <Continuous>  influenza  Vaccine (HIGH DOSE) 0.7 milliLiter(s) IntraMuscular once  ondansetron Injectable 4 milliGRAM(s) IV Push once  pantoprazole  Injectable 40 milliGRAM(s) IV Push two times a day    MEDICATIONS  (PRN):  ondansetron Injectable 4 milliGRAM(s) IV Push every 6 hours PRN Nausea and/or Vomiting      Vital Signs Last 24 Hrs  T(C): 36.8 (25 Oct 2022 05:25), Max: 36.8 (24 Oct 2022 21:42)  T(F): 98.2 (25 Oct 2022 05:25), Max: 98.3 (24 Oct 2022 21:42)  HR: 70 (25 Oct 2022 05:25) (70 - 76)  BP: 122/58 (25 Oct 2022 05:25) (122/58 - 152/72)  BP(mean): --  RR: 16 (25 Oct 2022 05:25) (16 - 18)  SpO2: 97% (25 Oct 2022 05:25) (95% - 97%)    Parameters below as of 24 Oct 2022 21:42  Patient On (Oxygen Delivery Method): room air        Physical:  General: A&Ox3. NAD.  Respirations: Unlabored   Abdomen: Soft nondistended, nontender. No rebound, no guarding, no peritonitis     I&O's Detail      LABS:                        11.7   5.79  )-----------( 197      ( 25 Oct 2022 06:55 )             33.7             10-25    143  |  110<H>  |  6<L>  ----------------------------<  184<H>  3.7   |  26  |  1.24    Ca    8.8      25 Oct 2022 06:55  Phos  3.2     10-24  Mg     1.9     10-24

## 2022-10-25 NOTE — PROGRESS NOTE ADULT - ASSESSMENT
88 y/o male with SBO r/o bowel ischemia    -Advance to regular diet   -Pain control prn   -OOB/ambulation   -Dc planning  -Discussed with Dr. Cohen    86 y/o male with SBO r/o bowel ischemia    -Advance to LFD  -Pain control prn   -OOB/ambulation   -Dc tomorrow 10/26  -Discussed with Dr. Cohen

## 2022-10-25 NOTE — PROGRESS NOTE ADULT - SUBJECTIVE AND OBJECTIVE BOX
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 Admitted to surgery for SBO r/o bowel ischemia.     Medicine consulted for co-management of comorbidities. As per patient lives with wife who is wheelchair bound. Has a dughter caring for her while he is in hospital.    Patient doing better; Off NGT since 2 days; Started on clear liquids by Sx;  Not in distress. No abdominal pain; Passing flatus but no BM  denies fever, chills, SOB, palpitations, chest pain, nausea, vomiting, dizziness    Vital Signs Last 24 Hrs  T(C): 36.8 (24 Oct 2022 21:42), Max: 37.3 (24 Oct 2022 05:26)  T(F): 98.3 (24 Oct 2022 21:42), Max: 99.1 (24 Oct 2022 05:26)  HR: 73 (24 Oct 2022 21:42) (70 - 76)  BP: 152/72 (24 Oct 2022 21:42) (104/53 - 152/72)  RR: 16 (24 Oct 2022 21:42) (15 - 18)  SpO2: 95% (24 Oct 2022 21:42) (95% - 96%) room air    P/E:  elderly male, comfortably resting in bed, NAD at rest  Psych: AAO x2.5 -3  Neuro: No gross focal deficits; Power and sensation intact  CVS: S1S2 present, regular, no edema  Resp: BLAE+, No wheeze or Rhonchi  GI: Soft, BS ++ improved all four quadrants Non tender, mildly distended  Extr: No  calf tenderness B/L Lower extremities  Skin: Warm and moist without any rashes    Labs: Reviewed; stable                        10.4   5.46  )-----------( 164      ( 24 Oct 2022 05:21 )             31.3   10-24    144  |  110<H>  |  9   ----------------------------<  195<H>  3.7   |  26  |  1.27    Ca    8.1<L>      24 Oct 2022 05:21  Phos  3.2     10-24  Mg     1.9     10-24    a1c    CT Abdomen and Pelvis w/ IV Cont (10.21.22 @ 14:49)     IMPRESSION:  Distended small bowel with transition seen in the right side of the abdomen where there is a long segment of thick-walled small bowel with adjacent mild ascites. This may be related to enteritis from bowel ischemia. Small bowel obstruction with an inflamed segment of small bowel may be within the differential diagnosis as well as enteritis from infectious or inflammatory etiologies. Further evaluation is warranted.           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 Admitted to surgery for SBO r/o bowel ischemia.     Medicine consulted for co-management of comorbidities. As per patient lives with wife who is wheelchair bound. Has a dughter caring for her while he is in hospital.    Patient doing better; Off NGT since 2 days; Started on clear liquids by Sx;  Not in distress. No abdominal pain; Passing flatus but no BM  denies fever, chills, SOB, palpitations, chest pain, nausea, vomiting, dizziness    Vital Signs Last 24 Hrs  T(C): 36.8 (24 Oct 2022 21:42), Max: 37.3 (24 Oct 2022 05:26)  T(F): 98.3 (24 Oct 2022 21:42), Max: 99.1 (24 Oct 2022 05:26)  HR: 73 (24 Oct 2022 21:42) (70 - 76)  BP: 152/72 (24 Oct 2022 21:42) (104/53 - 152/72)  RR: 16 (24 Oct 2022 21:42) (15 - 18)  SpO2: 95% (24 Oct 2022 21:42) (95% - 96%) room air    P/E:  elderly male, comfortably resting in bed, NAD at rest  Psych: AAO x2.5 -3  Neuro: No gross focal deficits; Power and sensation intact  CVS: S1S2 present, regular, no edema  Resp: BLAE+, No wheeze or Rhonchi  GI: Soft, BS ++ improved all four quadrants Non tender, mildly distended  Extr: No  calf tenderness B/L Lower extremities  Skin: Warm and moist without any rashes    Labs: Reviewed; stable                        10.4   5.46  )-----------( 164      ( 24 Oct 2022 05:21 )             31.3   10-24    144  |  110<H>  |  9   ----------------------------<  195<H>  3.7   |  26  |  1.27    Ca    8.1<L>      24 Oct 2022 05:21  Phos  3.2     10-24  Mg     1.9     10-24      CT Abdomen and Pelvis w/ IV Cont (10.21.22 @ 14:49)     IMPRESSION:  Distended small bowel with transition seen in the right side of the abdomen where there is a long segment of thick-walled small bowel with adjacent mild ascites. This may be related to enteritis from bowel ischemia. Small bowel obstruction with an inflamed segment of small bowel may be within the differential diagnosis as well as enteritis from infectious or inflammatory etiologies. Further evaluation is warranted.

## 2022-10-25 NOTE — PROGRESS NOTE ADULT - ASSESSMENT
SBO due to bowel ischemia   PRETTY improved   High BP    Plan:  SBO improving   Diet as per surgery   Added A1c on labs, please add ISS since diet is advanced   BP in tolerable range, cont to monitor now   Monitor electrolytes and supplement as needed   Cont GI ppx with Protonix  Please consider DVT ppx since Hb stable, no GIB and patient is mostly sedentary

## 2022-10-26 ENCOUNTER — TRANSCRIPTION ENCOUNTER (OUTPATIENT)
Age: 87
End: 2022-10-26

## 2022-10-26 VITALS
HEART RATE: 78 BPM | SYSTOLIC BLOOD PRESSURE: 108 MMHG | DIASTOLIC BLOOD PRESSURE: 65 MMHG | OXYGEN SATURATION: 95 % | RESPIRATION RATE: 16 BRPM | TEMPERATURE: 98 F

## 2022-10-26 LAB
A1C WITH ESTIMATED AVERAGE GLUCOSE RESULT: 6.8 % — HIGH (ref 4–5.6)
ANION GAP SERPL CALC-SCNC: 7 MMOL/L — SIGNIFICANT CHANGE UP (ref 5–17)
BUN SERPL-MCNC: 8 MG/DL — SIGNIFICANT CHANGE UP (ref 7–18)
CALCIUM SERPL-MCNC: 8.7 MG/DL — SIGNIFICANT CHANGE UP (ref 8.4–10.5)
CHLORIDE SERPL-SCNC: 110 MMOL/L — HIGH (ref 96–108)
CO2 SERPL-SCNC: 28 MMOL/L — SIGNIFICANT CHANGE UP (ref 22–31)
CREAT SERPL-MCNC: 1.41 MG/DL — HIGH (ref 0.5–1.3)
EGFR: 49 ML/MIN/1.73M2 — LOW
ESTIMATED AVERAGE GLUCOSE: 148 MG/DL — HIGH (ref 68–114)
GLUCOSE BLDC GLUCOMTR-MCNC: 151 MG/DL — HIGH (ref 70–99)
GLUCOSE SERPL-MCNC: 142 MG/DL — HIGH (ref 70–99)
HCT VFR BLD CALC: 33.9 % — LOW (ref 39–50)
HGB BLD-MCNC: 11.5 G/DL — LOW (ref 13–17)
MCHC RBC-ENTMCNC: 32 PG — SIGNIFICANT CHANGE UP (ref 27–34)
MCHC RBC-ENTMCNC: 33.9 GM/DL — SIGNIFICANT CHANGE UP (ref 32–36)
MCV RBC AUTO: 94.4 FL — SIGNIFICANT CHANGE UP (ref 80–100)
NRBC # BLD: 0 /100 WBCS — SIGNIFICANT CHANGE UP (ref 0–0)
PLATELET # BLD AUTO: 215 K/UL — SIGNIFICANT CHANGE UP (ref 150–400)
POTASSIUM SERPL-MCNC: 4.2 MMOL/L — SIGNIFICANT CHANGE UP (ref 3.5–5.3)
POTASSIUM SERPL-SCNC: 4.2 MMOL/L — SIGNIFICANT CHANGE UP (ref 3.5–5.3)
RBC # BLD: 3.59 M/UL — LOW (ref 4.2–5.8)
RBC # FLD: 12.9 % — SIGNIFICANT CHANGE UP (ref 10.3–14.5)
SODIUM SERPL-SCNC: 145 MMOL/L — SIGNIFICANT CHANGE UP (ref 135–145)
WBC # BLD: 6.63 K/UL — SIGNIFICANT CHANGE UP (ref 3.8–10.5)
WBC # FLD AUTO: 6.63 K/UL — SIGNIFICANT CHANGE UP (ref 3.8–10.5)

## 2022-10-26 PROCEDURE — 99233 SBSQ HOSP IP/OBS HIGH 50: CPT

## 2022-10-26 RX ADMIN — PANTOPRAZOLE SODIUM 40 MILLIGRAM(S): 20 TABLET, DELAYED RELEASE ORAL at 05:30

## 2022-10-26 NOTE — DISCHARGE NOTE PROVIDER - NSDCCPCAREPLAN_GEN_ALL_CORE_FT
PRINCIPAL DISCHARGE DIAGNOSIS  Diagnosis: SBO (small bowel obstruction)  Assessment and Plan of Treatment: Please return to the ED for any worsening abdominal pain, nauea, vomiting, or inability to have a BM.   Follow-up with Dr. Choen in the office in 2-3 weeks.

## 2022-10-26 NOTE — DISCHARGE NOTE NURSING/CASE MANAGEMENT/SOCIAL WORK - PATIENT PORTAL LINK FT
You can access the FollowMyHealth Patient Portal offered by [facility] by registering at the following website: [email or web address]. By joining [facility]’s FollowMyHealth portal, you will also be able to view your health information using other applications (apps) compatible with our system.

## 2022-10-26 NOTE — PROGRESS NOTE ADULT - SUBJECTIVE AND OBJECTIVE BOX
INTERVAL HPI/OVERNIGHT EVENTS:  Pt resting comfortably in bed. No acute complaints overnight. Pt admits to tolerating diet and ambulating well. Admits to flatus and bowel movement. Denies nausea, vomiting, fever, chills.       MEDICATIONS  (STANDING):  dextrose 5% + sodium chloride 0.45%. 1000 milliLiter(s) (120 mL/Hr) IV Continuous <Continuous>  dextrose 5%. 1000 milliLiter(s) (50 mL/Hr) IV Continuous <Continuous>  dextrose 5%. 1000 milliLiter(s) (100 mL/Hr) IV Continuous <Continuous>  dextrose 50% Injectable 25 Gram(s) IV Push once  dextrose 50% Injectable 12.5 Gram(s) IV Push once  dextrose 50% Injectable 25 Gram(s) IV Push once  enoxaparin Injectable 40 milliGRAM(s) SubCutaneous every 24 hours  glucagon  Injectable 1 milliGRAM(s) IntraMuscular once  influenza  Vaccine (HIGH DOSE) 0.7 milliLiter(s) IntraMuscular once  insulin lispro (ADMELOG) corrective regimen sliding scale   SubCutaneous three times a day before meals  insulin lispro (ADMELOG) corrective regimen sliding scale   SubCutaneous at bedtime  ondansetron Injectable 4 milliGRAM(s) IV Push once  pantoprazole  Injectable 40 milliGRAM(s) IV Push two times a day    MEDICATIONS  (PRN):  dextrose Oral Gel 15 Gram(s) Oral once PRN Blood Glucose LESS THAN 70 milliGRAM(s)/deciliter  ondansetron Injectable 4 milliGRAM(s) IV Push every 6 hours PRN Nausea and/or Vomiting      Vital Signs Last 24 Hrs  T(C): 36.6 (26 Oct 2022 04:50), Max: 36.9 (25 Oct 2022 14:26)  T(F): 97.8 (26 Oct 2022 04:50), Max: 98.5 (25 Oct 2022 14:26)  HR: 68 (26 Oct 2022 04:50) (67 - 71)  BP: 128/55 (26 Oct 2022 04:50) (123/53 - 137/63)  BP(mean): 71 (25 Oct 2022 21:01) (71 - 71)  RR: 18 (26 Oct 2022 04:50) (16 - 18)  SpO2: 95% (26 Oct 2022 04:50) (95% - 98%)    Parameters below as of 26 Oct 2022 04:50  Patient On (Oxygen Delivery Method): room air        Physical:  General: A&Ox3. NAD.  Resp: Unlabored breathing. Equal chest rise bilaterally.  Abdomen: Soft nondistended, nontender. No voluntary guarding, no palpable masses.  Extremities: No calf tenderness.    I&O's Detail      LABS:                        11.5   6.63  )-----------( 215      ( 26 Oct 2022 07:17 )             33.9             10-26    145  |  110<H>  |  8   ----------------------------<  142<H>  4.2   |  28  |  1.41<H>    Ca    8.7      26 Oct 2022 07:17

## 2022-10-26 NOTE — PROGRESS NOTE ADULT - REASON FOR ADMISSION
Hematemesis

## 2022-10-26 NOTE — PROGRESS NOTE ADULT - SUBJECTIVE AND OBJECTIVE BOX
Patient is a 87y old  Male who presents with a chief complaint of Hematemesis (26 Oct 2022 08:51)    Patient was seen and examined at bedside   Denies any complains  Tolerating solid food, anxious to go home     INTERVAL HPI/OVERNIGHT EVENTS:  T(C): 36.6 (10-26-22 @ 04:50), Max: 36.9 (10-25-22 @ 14:26)  HR: 68 (10-26-22 @ 04:50) (67 - 71)  BP: 128/55 (10-26-22 @ 04:50) (123/53 - 137/63)  RR: 18 (10-26-22 @ 04:50) (16 - 18)  SpO2: 95% (10-26-22 @ 04:50) (95% - 98%)  Wt(kg): --  I&O's Summary      REVIEW OF SYSTEMS: denies fever, chills, SOB, palpitations, chest pain, abdominal pain, nausea, vomiting, diarrhea, constipation, dizziness    MEDICATIONS  (STANDING):  dextrose 5% + sodium chloride 0.45%. 1000 milliLiter(s) (120 mL/Hr) IV Continuous <Continuous>  dextrose 5%. 1000 milliLiter(s) (50 mL/Hr) IV Continuous <Continuous>  dextrose 5%. 1000 milliLiter(s) (100 mL/Hr) IV Continuous <Continuous>  dextrose 50% Injectable 25 Gram(s) IV Push once  dextrose 50% Injectable 12.5 Gram(s) IV Push once  dextrose 50% Injectable 25 Gram(s) IV Push once  enoxaparin Injectable 40 milliGRAM(s) SubCutaneous every 24 hours  glucagon  Injectable 1 milliGRAM(s) IntraMuscular once  influenza  Vaccine (HIGH DOSE) 0.7 milliLiter(s) IntraMuscular once  insulin lispro (ADMELOG) corrective regimen sliding scale   SubCutaneous three times a day before meals  insulin lispro (ADMELOG) corrective regimen sliding scale   SubCutaneous at bedtime  ondansetron Injectable 4 milliGRAM(s) IV Push once  pantoprazole  Injectable 40 milliGRAM(s) IV Push two times a day    MEDICATIONS  (PRN):  dextrose Oral Gel 15 Gram(s) Oral once PRN Blood Glucose LESS THAN 70 milliGRAM(s)/deciliter  ondansetron Injectable 4 milliGRAM(s) IV Push every 6 hours PRN Nausea and/or Vomiting      PHYSICAL EXAM:  GENERAL: NAD, well-groomed, well-developed  HEAD:  Atraumatic, Normocephalic  NERVOUS SYSTEM:  Alert & Oriented X3, Good concentration; Motor Strength 5/5 B/L upper and lower extremities  CHEST/LUNG: Clear to auscultation bilaterally; No rales, rhonchi, wheezing, or rubs  HEART: Regular rate and rhythm; No murmurs, rubs, or gallops  ABDOMEN: Soft, Nontender, Nondistended; Bowel sounds present  EXTREMITIES:  2+ Peripheral Pulses, No clubbing, cyanosis, or edema  SKIN: No rashes or lesions    LABS:                        11.5   6.63  )-----------( 215      ( 26 Oct 2022 07:17 )             33.9     10-26    145  |  110<H>  |  8   ----------------------------<  142<H>  4.2   |  28  |  1.41<H>    Ca    8.7      26 Oct 2022 07:17          CAPILLARY BLOOD GLUCOSE      POCT Blood Glucose.: 151 mg/dL (26 Oct 2022 07:54)  POCT Blood Glucose.: 169 mg/dL (25 Oct 2022 21:16)

## 2022-10-26 NOTE — DISCHARGE NOTE PROVIDER - CARE PROVIDER_API CALL
Sergey Cohen (MD)  Surgery  [address] [place] [address]  Phone: [phone]  Fax: [phone]  Follow Up Time:

## 2022-10-26 NOTE — PROGRESS NOTE ADULT - ASSESSMENT
88 y/o male admitted for SBO r/o bowel ischemia, hemodynamically stable, tolerating diet, normal bowel function  VSS, labs WNL    Plan  -d/c planning   -awaiting A1C results rec by medicine  -continue low fiber diet  -incentive spirometry  -encourage ambulation

## 2022-10-26 NOTE — DISCHARGE NOTE NURSING/CASE MANAGEMENT/SOCIAL WORK - NSDCPEFALRISK_GEN_ALL_CORE
For information on Fall & Injury Prevention, visit: [email or web address] OR  [email or web address] OR  https://www.cdc.gov/steadi/patient.html

## 2022-10-26 NOTE — PROGRESS NOTE ADULT - PROVIDER SPECIALTY LIST ADULT
Hospitalist
Surgery
Hospitalist
Hospitalist
Internal Medicine
Internal Medicine
Surgery

## 2022-10-26 NOTE — DISCHARGE NOTE PROVIDER - HOSPITAL COURSE
87M with PMHx of BCC on left ear s/p wide excision, ?prostate CA s/p brachytherapy (seed implants seen on CT, however pt unsure of details), PSH of lap cholecystectomy and appendectomy; presented to the hospital with complaints of abdominal pain with 4 day history of nausea, vomiting and eventually hematemesis. Pt had a CT scan that showed a partial SBO with findings suspicious of bowel ischemia given trace ascites. No leukocytosis, lactate wnl, vital signs were stable. Abdomen exam benign. Given findings c/w bowel obstruction, NGT placed at bedside with 250cc of bilious return. Pt was admitted to the hospital for observation and gastric decompression with the NGT. Pt clinically improved, had return of bowel function and NGT was removed. Pts diet was advanced and tolerated. Pts blood sugars were slightly elevated and blood pressure was mildly elevated. Pt was monitored by the medical team in the hospital. Pt was stable for discharge with appropriate follow-up with Dr. Cohen and his PCP.     INCOMPLETE: PENDING A1C RESULTS for follow-up

## 2022-10-26 NOTE — PROGRESS NOTE ADULT - ASSESSMENT
SBO due to bowel ischemia   PRETTY   HTN    Plan:  Tolerating diet, moved bowel yesterday  Follow up A1c level   No leukocytosis or fever  BP in range, cont to monitor now  Cont IVF for PRETTY, needs outpatient follow up with PCP    Monitor electrolytes and supplement as needed   Cont GI ppx with Protonix  Discharge planning as per surgery

## 2022-11-08 PROCEDURE — 87077 CULTURE AEROBIC IDENTIFY: CPT

## 2022-11-08 PROCEDURE — 71045 X-RAY EXAM CHEST 1 VIEW: CPT

## 2022-11-08 PROCEDURE — 80053 COMPREHEN METABOLIC PANEL: CPT

## 2022-11-08 PROCEDURE — 83690 ASSAY OF LIPASE: CPT

## 2022-11-08 PROCEDURE — 86900 BLOOD TYPING SEROLOGIC ABO: CPT

## 2022-11-08 PROCEDURE — 83735 ASSAY OF MAGNESIUM: CPT

## 2022-11-08 PROCEDURE — 74177 CT ABD & PELVIS W/CONTRAST: CPT | Mod: ME

## 2022-11-08 PROCEDURE — 82962 GLUCOSE BLOOD TEST: CPT

## 2022-11-08 PROCEDURE — 85027 COMPLETE CBC AUTOMATED: CPT

## 2022-11-08 PROCEDURE — 83605 ASSAY OF LACTIC ACID: CPT

## 2022-11-08 PROCEDURE — 87086 URINE CULTURE/COLONY COUNT: CPT

## 2022-11-08 PROCEDURE — 99285 EMERGENCY DEPT VISIT HI MDM: CPT | Mod: 25

## 2022-11-08 PROCEDURE — 87186 SC STD MICRODIL/AGAR DIL: CPT

## 2022-11-08 PROCEDURE — 81001 URINALYSIS AUTO W/SCOPE: CPT

## 2022-11-08 PROCEDURE — 36415 COLL VENOUS BLD VENIPUNCTURE: CPT

## 2022-11-08 PROCEDURE — 80048 BASIC METABOLIC PNL TOTAL CA: CPT

## 2022-11-08 PROCEDURE — 86901 BLOOD TYPING SEROLOGIC RH(D): CPT

## 2022-11-08 PROCEDURE — 87635 SARS-COV-2 COVID-19 AMP PRB: CPT

## 2022-11-08 PROCEDURE — G1004: CPT

## 2022-11-08 PROCEDURE — 85730 THROMBOPLASTIN TIME PARTIAL: CPT

## 2022-11-08 PROCEDURE — 85025 COMPLETE CBC W/AUTO DIFF WBC: CPT

## 2022-11-08 PROCEDURE — 83036 HEMOGLOBIN GLYCOSYLATED A1C: CPT

## 2022-11-08 PROCEDURE — 82803 BLOOD GASES ANY COMBINATION: CPT

## 2022-11-08 PROCEDURE — 84100 ASSAY OF PHOSPHORUS: CPT

## 2022-11-08 PROCEDURE — 82330 ASSAY OF CALCIUM: CPT

## 2022-11-08 PROCEDURE — 85610 PROTHROMBIN TIME: CPT

## 2022-11-08 PROCEDURE — 84132 ASSAY OF SERUM POTASSIUM: CPT

## 2022-11-08 PROCEDURE — 84295 ASSAY OF SERUM SODIUM: CPT

## 2022-11-08 PROCEDURE — 86850 RBC ANTIBODY SCREEN: CPT

## 2024-06-28 PROBLEM — C44.91 BASAL CELL CARCINOMA OF SKIN, UNSPECIFIED: Chronic | Status: ACTIVE | Noted: 2022-10-21

## 2024-07-01 PROBLEM — Z00.00 ENCOUNTER FOR PREVENTIVE HEALTH EXAMINATION: Status: ACTIVE | Noted: 2024-07-01

## 2024-07-05 ENCOUNTER — APPOINTMENT (OUTPATIENT)
Dept: RADIATION ONCOLOGY | Facility: CLINIC | Age: 89
End: 2024-07-05
Payer: MEDICARE

## 2024-07-05 ENCOUNTER — NON-APPOINTMENT (OUTPATIENT)
Age: 89
End: 2024-07-05

## 2024-07-05 ENCOUNTER — APPOINTMENT (OUTPATIENT)
Dept: RADIATION ONCOLOGY | Facility: CLINIC | Age: 89
End: 2024-07-05

## 2024-07-05 VITALS — BODY MASS INDEX: 19.6 KG/M2 | WEIGHT: 140 LBS | RESPIRATION RATE: 18 BRPM | HEIGHT: 71 IN

## 2024-07-05 DIAGNOSIS — Z85.828 PERSONAL HISTORY OF OTHER MALIGNANT NEOPLASM OF SKIN: ICD-10-CM

## 2024-07-05 DIAGNOSIS — Z92.21 PERSONAL HISTORY OF ANTINEOPLASTIC CHEMOTHERAPY: ICD-10-CM

## 2024-07-05 DIAGNOSIS — Z78.9 OTHER SPECIFIED HEALTH STATUS: ICD-10-CM

## 2024-07-05 DIAGNOSIS — E11.9 TYPE 2 DIABETES MELLITUS W/OUT COMPLICATIONS: ICD-10-CM

## 2024-07-05 PROCEDURE — 99204 OFFICE O/P NEW MOD 45 MIN: CPT

## 2024-07-05 RX ORDER — METFORMIN HYDROCHLORIDE 625 MG/1
TABLET ORAL
Refills: 0 | Status: ACTIVE | COMMUNITY

## 2024-07-05 RX ORDER — LEVOTHYROXINE SODIUM 137 UG/1
TABLET ORAL
Refills: 0 | Status: ACTIVE | COMMUNITY

## 2024-07-05 RX ORDER — OXYCODONE HYDROCHLORIDE 30 MG/1
TABLET ORAL
Refills: 0 | Status: ACTIVE | COMMUNITY

## 2024-07-05 RX ORDER — DULOXETINE HYDROCHLORIDE 40 MG/1
CAPSULE, DELAYED RELEASE PELLETS ORAL
Refills: 0 | Status: ACTIVE | COMMUNITY

## 2024-07-05 RX ORDER — FOLIC ACID 20 MG
CAPSULE ORAL
Refills: 0 | Status: ACTIVE | COMMUNITY

## 2024-07-05 RX ORDER — OXYCODONE HCL 5 MG
TABLET ORAL
Refills: 0 | Status: ACTIVE | COMMUNITY

## 2024-07-18 ENCOUNTER — OUTPATIENT (OUTPATIENT)
Dept: OUTPATIENT SERVICES | Facility: HOSPITAL | Age: 89
LOS: 1 days | End: 2024-07-18
Payer: MEDICARE

## 2024-07-18 ENCOUNTER — APPOINTMENT (OUTPATIENT)
Dept: CT IMAGING | Facility: HOSPITAL | Age: 89
End: 2024-07-18

## 2024-07-18 DIAGNOSIS — C07 MALIGNANT NEOPLASM OF PAROTID GLAND: ICD-10-CM

## 2024-07-18 DIAGNOSIS — Z90.49 ACQUIRED ABSENCE OF OTHER SPECIFIED PARTS OF DIGESTIVE TRACT: Chronic | ICD-10-CM

## 2024-07-18 PROCEDURE — 77263 THER RADIOLOGY TX PLNG CPLX: CPT

## 2024-07-18 PROCEDURE — 77290 THER RAD SIMULAJ FIELD CPLX: CPT

## 2024-07-18 PROCEDURE — 77334 RADIATION TREATMENT AID(S): CPT

## 2024-07-22 ENCOUNTER — NON-APPOINTMENT (OUTPATIENT)
Age: 89
End: 2024-07-22

## 2024-07-30 ENCOUNTER — NON-APPOINTMENT (OUTPATIENT)
Age: 89
End: 2024-07-30

## 2024-07-31 ENCOUNTER — NON-APPOINTMENT (OUTPATIENT)
Age: 89
End: 2024-07-31

## 2024-08-01 PROCEDURE — 77301 RADIOTHERAPY DOSE PLAN IMRT: CPT

## 2024-08-01 PROCEDURE — 77300 RADIATION THERAPY DOSE PLAN: CPT

## 2024-08-01 PROCEDURE — 77338 DESIGN MLC DEVICE FOR IMRT: CPT

## 2024-08-07 ENCOUNTER — APPOINTMENT (OUTPATIENT)
Dept: OTOLARYNGOLOGY | Facility: CLINIC | Age: 89
End: 2024-08-07

## 2024-08-12 PROCEDURE — 77427 RADIATION TX MANAGEMENT X5: CPT

## 2024-08-12 PROCEDURE — G6015: CPT

## 2024-08-12 PROCEDURE — G6002: CPT

## 2024-08-13 PROCEDURE — G6002: CPT

## 2024-08-13 PROCEDURE — G6015: CPT

## 2024-08-14 PROCEDURE — G6015: CPT

## 2024-08-14 PROCEDURE — G6002: CPT

## 2024-08-15 ENCOUNTER — NON-APPOINTMENT (OUTPATIENT)
Age: 89
End: 2024-08-15

## 2024-08-15 VITALS — BODY MASS INDEX: 21.14 KG/M2 | WEIGHT: 151 LBS | HEIGHT: 71 IN

## 2024-08-15 DIAGNOSIS — D49.0 NEOPLASM OF UNSPECIFIED BEHAVIOR OF DIGESTIVE SYSTEM: ICD-10-CM

## 2024-08-15 PROCEDURE — G6015: CPT

## 2024-08-15 PROCEDURE — G6002: CPT

## 2024-08-16 PROCEDURE — G6002: CPT

## 2024-08-16 PROCEDURE — G6015: CPT

## 2024-08-16 PROCEDURE — 77336 RADIATION PHYSICS CONSULT: CPT

## 2024-08-19 PROCEDURE — 77427 RADIATION TX MANAGEMENT X5: CPT

## 2024-08-19 PROCEDURE — G6002: CPT

## 2024-08-19 PROCEDURE — G6015: CPT

## 2024-08-20 PROCEDURE — G6002: CPT

## 2024-08-20 PROCEDURE — G6015: CPT

## 2024-08-21 PROCEDURE — G6017: CPT

## 2024-08-21 PROCEDURE — G6015: CPT

## 2024-08-21 NOTE — DISEASE MANAGEMENT
[FreeTextEntry4] : Recurrent [TTNM] : x [NTNM] : 0 [MTNM] : 0 [de-identified] : 66 Gy [de-identified] : left neck / parotid

## 2024-08-21 NOTE — REVIEW OF SYSTEMS
[FreeTextEntry7] : cholecystectomy [FreeTextEntry8] : h/o prostate cancer [de-identified] : back BCC, left ear / neck SCC [de-identified] : DM2, hypothyroidism

## 2024-08-21 NOTE — HISTORY OF PRESENT ILLNESS
[FreeTextEntry1] : 87 y/o male with prostate cancer, DM2, anxiety, h/o total left auriculectomy for SCC (pT3Nx), h/o surgery for back BCC, h/o left neck surgery for SCC in 5/2024, h/o chemo for left neck SCC presents to discuss radiation options for left neck SCC.  7/2022 -- Total left auriculectomy for SCC, pT3Nx. Then immuno / chemotherapy with Dr Hernandez. 4/2024 -- PET scan done and CT of head done 5/2024 -- left neck surgery with Dr Bullock @ [facility] in [facility] showed negative margin.   7/5/2024 Consultation. c/o left neck pain, difficulty chewing, no taste, poor appetite.  8/15/2024 OTV. 4/33 fractions of RT to left neck / parotid completed. c/o left neck pain, difficulty chewing, no taste, poor appetite even before RT started. Blood script given to be done in few days with PCP

## 2024-08-21 NOTE — HISTORY OF PRESENT ILLNESS
[FreeTextEntry1] : 87 y/o male with prostate cancer, DM2, anxiety, h/o total left auriculectomy for SCC (pT3Nx), h/o surgery for back BCC, h/o left neck surgery for SCC in 5/2024, h/o chemo for left neck SCC presents to discuss radiation options for left neck SCC.  7/2022 -- Total left auriculectomy for SCC, pT3Nx. Then immuno / chemotherapy with Dr Hernandez. 4/2024 -- PET scan done and CT of head done 5/2024 -- left neck surgery with Dr Bullock @ [facility] showed negative margin.   7/5/2024 Consultation. c/o left neck pain, difficulty chewing, no taste, poor appetite.  8/15/2024 OTV. 4/33 fractions of RT to left neck / parotid completed. c/o left neck pain, difficulty chewing, no taste, poor appetite even before RT started. Blood script given to be done in few days with PCP

## 2024-08-21 NOTE — DISEASE MANAGEMENT
[FreeTextEntry4] : Recurrent [TTNM] : x [NTNM] : 0 [MTNM] : 0 [de-identified] : 66 Gy [de-identified] : left neck / parotid

## 2024-08-21 NOTE — REVIEW OF SYSTEMS
[FreeTextEntry7] : cholecystectomy [de-identified] : back BCC, left ear / neck SCC [FreeTextEntry8] : h/o prostate cancer [de-identified] : DM2, hypothyroidism

## 2024-08-22 ENCOUNTER — NON-APPOINTMENT (OUTPATIENT)
Age: 89
End: 2024-08-22

## 2024-08-22 VITALS — RESPIRATION RATE: 18 BRPM | WEIGHT: 151 LBS | HEIGHT: 71 IN | BODY MASS INDEX: 21.14 KG/M2

## 2024-08-22 PROCEDURE — G6017: CPT

## 2024-08-22 PROCEDURE — G6015: CPT

## 2024-08-22 NOTE — VITALS
[Maximal Pain Intensity: 5/10] : 5/10 [Least Pain Intensity: 1/10] : 1/10 [Pain Description/Quality: ___] : Pain description/quality: [unfilled] [Pain Duration: ___] : Pain duration: [unfilled] [Pain Location: ___] : Pain Location: [unfilled] [Opioid] : opioid [80: Normal activity with effort; some signs or symptoms of disease.] : 80: Normal activity with effort; some signs or symptoms of disease.  [ECOG Performance Status: 2 - Ambulatory and capable of all self care but unable to carry out any work activities] : Performance Status: 2 - Ambulatory and capable of all self care but unable to carry out any work activities. Up and about more than 50% of waking hours [5 - Distress Level] : Distress Level: 5

## 2024-08-22 NOTE — HISTORY OF PRESENT ILLNESS
[FreeTextEntry1] : 89 y/o male with prostate cancer, DM2, anxiety, h/o total left auriculectomy for SCC (pT3Nx), h/o surgery for back BCC, h/o left neck surgery for SCC in 5/2024, h/o chemo for left neck SCC presents to discuss radiation options for left neck SCC.  7/2022 -- Total left auriculectomy for SCC, pT3Nx. Then immuno / chemotherapy with Dr Hernandez. 4/2024 -- PET scan done and CT of head done 5/2024 -- left neck surgery with Dr Bullock @ [facility] in [facility] showed negative margin.   7/5/2024 Consultation. c/o left neck pain, difficulty chewing, no taste, poor appetite.  8/22/2024 OTV. 7/33 fractions of RT to left neck / parotid completed. c/o left neck pain, difficulty chewing, no taste, poor appetite even before RT started. Blood script given to be done in few days.

## 2024-08-22 NOTE — REVIEW OF SYSTEMS
[Negative] : Allergic/Immunologic [FreeTextEntry7] : cholecystectomy [FreeTextEntry8] : h/o prostate cancer [de-identified] : back BCC, left ear / neck SCC [de-identified] : DM2, hypothyroidism [Dysphagia: Grade 0] : Dysphagia: Grade 0 [Tinnitus - Grade 0] : Tinnitus - Grade 0 [Blurred Vision: Grade 0] : Blurred Vision: Grade 0 [Mucositis Oral: Grade 0] : Mucositis Oral: Grade 0  [Xerostomia: Grade 0] : Xerostomia: Grade 0 [Oral Pain: Grade 0] : Oral Pain: Grade 0 [Salivary duct inflammation: Grade 1 - Slightly thickened saliva; slightly altered taste (e.g., metallic)] : Salivary duct inflammation: Grade 1 - Slightly thickened saliva; slightly altered taste (e.g., metallic) [Dysgeusia: Grade 2 - Altered taste with change in diet (e.g., oral supplements); noxious or unpleasant taste; loss of taste] : Dysgeusia: Grade 2 - Altered taste with change in diet (e.g., oral supplements); noxious or unpleasant taste; loss of taste [Cough: Grade 0] : Cough: Grade 0 [Alopecia: Grade 0] : Alopecia: Grade 0 [Pruritus: Grade 0] : Pruritus: Grade 0 [Skin Atrophy: Grade 0] : Skin Atrophy: Grade 0 [Skin Hyperpigmentation: Grade 0] : Skin Hyperpigmentation: Grade 0 [Skin Induration: Grade 0] : Skin Induration: Grade 0 [Dermatitis Radiation: Grade 1 - Faint erythema or dry desquamation] : Dermatitis Radiation: Grade 1 - Faint erythema or dry desquamation

## 2024-08-23 PROCEDURE — 77336 RADIATION PHYSICS CONSULT: CPT

## 2024-08-23 PROCEDURE — G6015: CPT

## 2024-08-23 PROCEDURE — G6017: CPT

## 2024-08-26 PROCEDURE — G6017: CPT

## 2024-08-26 PROCEDURE — 77427 RADIATION TX MANAGEMENT X5: CPT

## 2024-08-26 PROCEDURE — G6015: CPT

## 2024-08-27 PROCEDURE — G6017: CPT

## 2024-08-27 PROCEDURE — G6015: CPT

## 2024-08-28 PROCEDURE — G6015: CPT

## 2024-08-28 PROCEDURE — G6017: CPT

## 2024-08-29 ENCOUNTER — NON-APPOINTMENT (OUTPATIENT)
Age: 89
End: 2024-08-29

## 2024-08-29 PROCEDURE — G6002: CPT

## 2024-08-29 PROCEDURE — G6015: CPT

## 2024-08-29 NOTE — DISEASE MANAGEMENT
[Clinical] : TNM Stage: c [FreeTextEntry4] : Recurrent [TTNM] : x [NTNM] : 0 [MTNM] : 0 [IV] : IV [de-identified] : 66 Gy [de-identified] : left neck / parotid

## 2024-08-29 NOTE — REVIEW OF SYSTEMS
[Negative] : Allergic/Immunologic [FreeTextEntry7] : cholecystectomy [FreeTextEntry8] : h/o prostate cancer [de-identified] : back BCC, left ear / neck SCC [de-identified] : DM2, hypothyroidism [Dysphagia: Grade 0] : Dysphagia: Grade 0 [Tinnitus - Grade 0] : Tinnitus - Grade 0 [Blurred Vision: Grade 0] : Blurred Vision: Grade 0 [Mucositis Oral: Grade 0] : Mucositis Oral: Grade 0  [Xerostomia: Grade 1 - Symptomatic (e.g., dry or thick saliva) without significant dietary alteration; unstimulated saliva flow >0.2 ml/min] : Xerostomia: Grade 1 - Symptomatic (e.g., dry or thick saliva) without significant dietary alteration; unstimulated saliva flow >0.2 ml/min [Oral Pain: Grade 0] : Oral Pain: Grade 0 [Salivary duct inflammation: Grade 1 - Slightly thickened saliva; slightly altered taste (e.g., metallic)] : Salivary duct inflammation: Grade 1 - Slightly thickened saliva; slightly altered taste (e.g., metallic) [Dysgeusia: Grade 2 - Altered taste with change in diet (e.g., oral supplements); noxious or unpleasant taste; loss of taste] : Dysgeusia: Grade 2 - Altered taste with change in diet (e.g., oral supplements); noxious or unpleasant taste; loss of taste [Cough: Grade 0] : Cough: Grade 0 [Alopecia: Grade 0] : Alopecia: Grade 0 [Pruritus: Grade 0] : Pruritus: Grade 0 [Skin Atrophy: Grade 0] : Skin Atrophy: Grade 0 [Skin Hyperpigmentation: Grade 1 - Hyperpigmentation covering <10% BSA; no psychosocial impact] : Skin Hyperpigmentation: Grade 1 - Hyperpigmentation covering <10% BSA; no psychosocial impact [Skin Induration: Grade 0] : Skin Induration: Grade 0 [Dermatitis Radiation: Grade 1 - Faint erythema or dry desquamation] : Dermatitis Radiation: Grade 1 - Faint erythema or dry desquamation

## 2024-08-29 NOTE — HISTORY OF PRESENT ILLNESS
[FreeTextEntry1] : 87 y/o male with prostate cancer, DM2, anxiety, h/o total left auriculectomy for SCC (pT3Nx), h/o surgery for back BCC, h/o left neck surgery for SCC in 5/2024, h/o chemo for left neck SCC presents to discuss radiation options for left neck SCC.  7/2022 -- Total left auriculectomy for SCC, pT3Nx. Then immuno / chemotherapy with Dr Hernandez. 4/2024 -- PET scan done and CT of head done 5/2024 -- left neck surgery with Dr Bullock @ [facility] in [facility] showed negative margin.   7/5/2024 Consultation. c/o left neck pain, difficulty chewing, no taste, poor appetite.  8/29/2024 OTV. 14/33 fractions of RT to left neck / parotid completed. c/o left neck pain, difficulty chewing, no taste, poor appetite even before RT started. Recent blood test okay.

## 2024-08-30 PROCEDURE — G6002: CPT

## 2024-08-30 PROCEDURE — 77336 RADIATION PHYSICS CONSULT: CPT

## 2024-08-30 PROCEDURE — G6015: CPT

## 2024-09-03 PROCEDURE — G6015: CPT

## 2024-09-03 PROCEDURE — 77427 RADIATION TX MANAGEMENT X5: CPT

## 2024-09-03 PROCEDURE — G6017: CPT

## 2024-09-04 PROCEDURE — G6015: CPT

## 2024-09-04 PROCEDURE — G6017: CPT

## 2024-09-05 ENCOUNTER — NON-APPOINTMENT (OUTPATIENT)
Age: 89
End: 2024-09-05

## 2024-09-05 PROCEDURE — G6015: CPT

## 2024-09-05 PROCEDURE — G6017: CPT

## 2024-09-05 NOTE — DISEASE MANAGEMENT
[Clinical] : TNM Stage: c [FreeTextEntry4] : Recurrent [TTNM] : x [NTNM] : 0 [MTNM] : 0 [IV] : IV [de-identified] : left neck / parotid [de-identified] : 66 Gy

## 2024-09-05 NOTE — HISTORY OF PRESENT ILLNESS
[FreeTextEntry1] : 89 y/o male with prostate cancer, DM2, anxiety, h/o total left auriculectomy for SCC (pT3Nx), h/o surgery for back BCC, h/o left neck surgery for SCC in 5/2024, h/o chemo for left neck SCC presents to discuss radiation options for left neck SCC.  7/2022 -- Total left auriculectomy for SCC, pT3Nx. Then immuno / chemotherapy with Dr Hernandez. 4/2024 -- PET scan done and CT of head done 5/2024 -- left neck surgery with Dr Bullock @ [facility] in [facility] showed negative margin.   7/5/2024 Consultation. c/o left neck pain, difficulty chewing, no taste, poor appetite.  9/5/2024 OTV. 18/33 fractions of RT to left neck / parotid completed. c/o left neck pain, difficulty chewing, no taste, poor appetite even before RT started. Recent blood test okay.

## 2024-09-06 PROCEDURE — G6015: CPT

## 2024-09-06 PROCEDURE — G6002: CPT

## 2024-09-09 PROCEDURE — G6002: CPT

## 2024-09-09 PROCEDURE — 77336 RADIATION PHYSICS CONSULT: CPT

## 2024-09-09 PROCEDURE — G6015: CPT

## 2024-09-10 PROCEDURE — G6015: CPT

## 2024-09-10 PROCEDURE — G6002: CPT

## 2024-09-10 PROCEDURE — 77427 RADIATION TX MANAGEMENT X5: CPT

## 2024-09-11 PROCEDURE — G6015: CPT

## 2024-09-11 PROCEDURE — G6002: CPT

## 2024-09-12 ENCOUNTER — NON-APPOINTMENT (OUTPATIENT)
Age: 89
End: 2024-09-12

## 2024-09-12 PROCEDURE — G6002: CPT

## 2024-09-12 PROCEDURE — G6015: CPT

## 2024-09-13 PROCEDURE — G6002: CPT

## 2024-09-13 PROCEDURE — G6015: CPT

## 2024-09-16 PROCEDURE — G6015: CPT

## 2024-09-16 PROCEDURE — G6002: CPT

## 2024-09-16 PROCEDURE — 77336 RADIATION PHYSICS CONSULT: CPT

## 2024-09-17 PROCEDURE — G6002: CPT

## 2024-09-17 PROCEDURE — 77427 RADIATION TX MANAGEMENT X5: CPT

## 2024-09-17 PROCEDURE — G6015: CPT

## 2024-09-18 PROCEDURE — G6015: CPT

## 2024-09-18 PROCEDURE — G6002: CPT

## 2024-09-19 ENCOUNTER — NON-APPOINTMENT (OUTPATIENT)
Age: 89
End: 2024-09-19

## 2024-09-19 ENCOUNTER — LABORATORY RESULT (OUTPATIENT)
Age: 89
End: 2024-09-19

## 2024-09-19 VITALS
HEART RATE: 80 BPM | OXYGEN SATURATION: 98 % | DIASTOLIC BLOOD PRESSURE: 58 MMHG | SYSTOLIC BLOOD PRESSURE: 97 MMHG | TEMPERATURE: 97.6 F | RESPIRATION RATE: 18 BRPM

## 2024-09-19 VITALS — DIASTOLIC BLOOD PRESSURE: 62 MMHG | HEART RATE: 83 BPM | SYSTOLIC BLOOD PRESSURE: 120 MMHG

## 2024-09-19 PROCEDURE — G6015: CPT

## 2024-09-19 PROCEDURE — G6002: CPT

## 2024-09-19 RX ORDER — DIPHENHYDRAMINE HYDROCHLORIDE AND LIDOCAINE HYDROCHLORIDE AND ALUMINUM HYDROXIDE AND MAGNESIUM HYDRO
KIT
Qty: 999 | Refills: 3 | Status: DISCONTINUED | COMMUNITY
Start: 2024-09-19 | End: 2024-09-19

## 2024-09-19 RX ORDER — SILVER SULFADIAZINE 10 MG/G
1 CREAM TOPICAL
Qty: 1 | Refills: 2 | Status: ACTIVE | COMMUNITY
Start: 2024-09-19 | End: 1900-01-01

## 2024-09-19 NOTE — HISTORY OF PRESENT ILLNESS
[FreeTextEntry1] : 89 y/o male with prostate cancer, DM2, anxiety, h/o total left auriculectomy for SCC (pT3Nx), h/o surgery for back BCC, h/o left neck surgery for SCC in 5/2024, h/o chemo for left neck SCC presents to discuss radiation options for left neck SCC.  7/2022 -- Total left auriculectomy for SCC, pT3Nx. Then immuno / chemotherapy with Dr Hernandez. 4/2024 -- PET scan done and CT of head done 5/2024 -- left neck surgery with Dr Bullock @ [facility] in [facility] showed negative margin.   7/5/2024 Consultation. c/o left neck pain, difficulty chewing, no taste, poor appetite.  9/12/2024 OTV. 23/33 fractions of RT to left neck / parotid completed. c/o left neck pain, difficulty chewing, no taste, poor appetite even before RT started. Rx blood test given to be done soon.

## 2024-09-19 NOTE — DISEASE MANAGEMENT
[FreeTextEntry4] : Recurrent [TTNM] : x [NTNM] : 0 [MTNM] : 0 [de-identified] : 66 Gy [de-identified] : left neck / parotid

## 2024-09-19 NOTE — DISEASE MANAGEMENT
[FreeTextEntry4] : Recurrent [TTNM] : x [NTNM] : 0 [MTNM] : 0 [de-identified] : 66 Gy [de-identified] : left neck / parotid

## 2024-09-19 NOTE — HISTORY OF PRESENT ILLNESS
[FreeTextEntry1] : 88 y/o male with prostate cancer, DM2, anxiety, h/o total left auriculectomy for SCC (pT3Nx), h/o surgery for back BCC, h/o left neck surgery for SCC in 5/2024, h/o chemo for left neck SCC presents to discuss radiation options for left neck SCC.  7/2022 -- Total left auriculectomy for SCC, pT3Nx. Then immuno / chemotherapy with Dr Hernandez. 4/2024 -- PET scan done and CT of head done 5/2024 -- left neck surgery with Dr Bullock @ [facility] in [facility] showed negative margin.   7/5/2024 Consultation. c/o left neck pain, difficulty chewing, no taste, poor appetite.  9/12/2024 OTV. 23/33 fractions of RT to left neck / parotid completed. c/o left neck pain, difficulty chewing, no taste, poor appetite even before RT started. Rx blood test given to be done soon.  9/19/2024 OTV: 28/33 fractions of radiation therapy to left neck/parotid completed. Total dose given 5600/6600 cGy. Today, patient is feeling a lot of mouth pain. He has not eaten well in 5 days. ate some cucumbers and spaghetti yesterday. weight down 12 pounds from mid august. Some skin breakdown noted to left neck. Not taking any medications for pain. Notes some blood from mouth yesterday.

## 2024-09-19 NOTE — VITALS
[Maximal Pain Intensity: 10/10] : 10/10 [Least Pain Intensity: 10/10] : 10/10 [80: Normal activity with effort; some signs or symptoms of disease.] : 80: Normal activity with effort; some signs or symptoms of disease.

## 2024-09-19 NOTE — DISEASE MANAGEMENT
[Clinical] : TNM Stage: c [IV] : IV [FreeTextEntry4] : Recurrent [TTNM] : x [NTNM] : 0 [MTNM] : 0 [de-identified] : 5600 cGy [de-identified] : 6600 cGy Gy [de-identified] : left neck / parotid gland

## 2024-09-19 NOTE — REVIEW OF SYSTEMS
[Neck Edema: Grade 1 - Asymptomatic localized neck edema] : Neck Edema: Grade 1 - Asymptomatic localized neck edema

## 2024-09-19 NOTE — HISTORY OF PRESENT ILLNESS
[FreeTextEntry1] : 89 y/o male with prostate cancer, DM2, anxiety, h/o total left auriculectomy for SCC (pT3Nx), h/o surgery for back BCC, h/o left neck surgery for SCC in 5/2024, h/o chemo for left neck SCC presents to discuss radiation options for left neck SCC.  7/2022 -- Total left auriculectomy for SCC, pT3Nx. Then immuno / chemotherapy with Dr Hernandez. 4/2024 -- PET scan done and CT of head done 5/2024 -- left neck surgery with Dr Bullock @ [facility] in [facility] showed negative margin.   7/5/2024 Consultation. c/o left neck pain, difficulty chewing, no taste, poor appetite.  9/12/2024 OTV. 23/33 fractions of RT to left neck / parotid completed. c/o left neck pain, difficulty chewing, no taste, poor appetite even before RT started. Rx blood test given to be done soon.

## 2024-09-19 NOTE — REVIEW OF SYSTEMS
[FreeTextEntry7] : cholecystectomy [FreeTextEntry8] : h/o prostate cancer [de-identified] : back BCC, left ear / neck SCC [de-identified] : DM2, hypothyroidism

## 2024-09-19 NOTE — HISTORY OF PRESENT ILLNESS
[FreeTextEntry1] : 89 y/o male with prostate cancer, DM2, anxiety, h/o total left auriculectomy for SCC (pT3Nx), h/o surgery for back BCC, h/o left neck surgery for SCC in 5/2024, h/o chemo for left neck SCC presents to discuss radiation options for left neck SCC.  7/2022 -- Total left auriculectomy for SCC, pT3Nx. Then immuno / chemotherapy with Dr Hernandez. 4/2024 -- PET scan done and CT of head done 5/2024 -- left neck surgery with Dr Bullock @ [facility] in [facility] showed negative margin.   7/5/2024 Consultation. c/o left neck pain, difficulty chewing, no taste, poor appetite.  9/12/2024 OTV. 23/33 fractions of RT to left neck / parotid completed. c/o left neck pain, difficulty chewing, no taste, poor appetite even before RT started. Rx blood test given to be done soon.  9/19/2024 OTV: 28/33 fractions of radiation therapy to left neck/parotid completed. Total dose given 5600/6600 cGy. Today, patient is feeling a lot of mouth pain. He has not eaten well in 5 days. ate some cucumbers and spaghetti yesterday. weight down 12 pounds from mid august. Some skin breakdown noted to left neck. Not taking any medications for pain. Notes some blood from mouth yesterday.

## 2024-09-19 NOTE — REVIEW OF SYSTEMS
[FreeTextEntry7] : cholecystectomy [FreeTextEntry8] : h/o prostate cancer [de-identified] : back BCC, left ear / neck SCC [de-identified] : DM2, hypothyroidism

## 2024-09-19 NOTE — DISEASE MANAGEMENT
[Clinical] : TNM Stage: c [IV] : IV [FreeTextEntry4] : Recurrent [TTNM] : x [NTNM] : 0 [MTNM] : 0 [de-identified] : 6600 cGy Gy [de-identified] : 5600 cGy [de-identified] : left neck / parotid gland

## 2024-09-20 LAB
ALBUMIN SERPL ELPH-MCNC: 4.2 G/DL
ALP BLD-CCNC: 91 U/L
ALT SERPL-CCNC: 11 U/L
ANION GAP SERPL CALC-SCNC: 13 MMOL/L
AST SERPL-CCNC: 20 U/L
BASOPHILS # BLD AUTO: 0 K/UL
BASOPHILS NFR BLD AUTO: 0 %
BILIRUB SERPL-MCNC: 0.8 MG/DL
BUN SERPL-MCNC: 26 MG/DL
CALCIUM SERPL-MCNC: 9.8 MG/DL
CHLORIDE SERPL-SCNC: 102 MMOL/L
CO2 SERPL-SCNC: 25 MMOL/L
CREAT SERPL-MCNC: 1.19 MG/DL
EGFR: 59 ML/MIN/1.73M2
EOSINOPHIL # BLD AUTO: 0.16 K/UL
EOSINOPHIL NFR BLD AUTO: 2.7 %
GLUCOSE SERPL-MCNC: 148 MG/DL
HCT VFR BLD CALC: 34.6 %
HGB BLD-MCNC: 11.3 G/DL
LYMPHOCYTES # BLD AUTO: 0.21 K/UL
LYMPHOCYTES NFR BLD AUTO: 3.5 %
MAN DIFF?: NORMAL
MCHC RBC-ENTMCNC: 32.6 PG
MCHC RBC-ENTMCNC: 32.7 GM/DL
MCV RBC AUTO: 99.7 FL
MONOCYTES # BLD AUTO: 0.49 K/UL
MONOCYTES NFR BLD AUTO: 8 %
NEUTROPHILS # BLD AUTO: 5.22 K/UL
NEUTROPHILS NFR BLD AUTO: 85.8 %
PLATELET # BLD AUTO: 170 K/UL
POTASSIUM SERPL-SCNC: 4.7 MMOL/L
PROT SERPL-MCNC: 7.3 G/DL
RBC # BLD: 3.47 M/UL
RBC # FLD: 13.2 %
SODIUM SERPL-SCNC: 140 MMOL/L
WBC # FLD AUTO: 6.08 K/UL

## 2024-09-20 PROCEDURE — G6015: CPT

## 2024-09-20 PROCEDURE — G6002: CPT

## 2024-09-23 PROCEDURE — G6002: CPT

## 2024-09-23 PROCEDURE — G6015: CPT

## 2024-09-23 PROCEDURE — 77336 RADIATION PHYSICS CONSULT: CPT

## 2024-09-24 ENCOUNTER — NON-APPOINTMENT (OUTPATIENT)
Age: 89
End: 2024-09-24

## 2024-09-24 PROCEDURE — G6002: CPT

## 2024-09-24 PROCEDURE — G6015: CPT

## 2024-09-24 PROCEDURE — 77427 RADIATION TX MANAGEMENT X5: CPT

## 2024-09-25 PROCEDURE — G6015: CPT

## 2024-09-25 PROCEDURE — G6002: CPT

## 2024-09-26 ENCOUNTER — NON-APPOINTMENT (OUTPATIENT)
Age: 89
End: 2024-09-26

## 2024-09-26 VITALS
DIASTOLIC BLOOD PRESSURE: 54 MMHG | RESPIRATION RATE: 18 BRPM | HEART RATE: 82 BPM | SYSTOLIC BLOOD PRESSURE: 138 MMHG | OXYGEN SATURATION: 100 %

## 2024-09-26 VITALS — BODY MASS INDEX: 20.22 KG/M2 | WEIGHT: 145 LBS

## 2024-09-26 VITALS — TEMPERATURE: 98.4 F | HEART RATE: 83 BPM | DIASTOLIC BLOOD PRESSURE: 59 MMHG | SYSTOLIC BLOOD PRESSURE: 110 MMHG

## 2024-09-26 PROCEDURE — 77336 RADIATION PHYSICS CONSULT: CPT

## 2024-09-26 PROCEDURE — G6015: CPT

## 2024-09-26 PROCEDURE — G6002: CPT

## 2024-09-26 RX ORDER — SODIUM CHLORIDE 9 G/ML
0.9 INJECTION, SOLUTION INTRAVENOUS
Qty: 1 | Refills: 0 | Status: DISCONTINUED | COMMUNITY
Start: 2024-09-26 | End: 2024-09-26

## 2024-09-26 RX ORDER — DIPHENHYDRAMINE HYDROCHLORIDE AND LIDOCAINE HYDROCHLORIDE AND ALUMINUM HYDROXIDE AND MAGNESIUM HYDRO
KIT
Qty: 1 | Refills: 3 | Status: ACTIVE | COMMUNITY
Start: 2024-09-19 | End: 1900-01-01

## 2024-09-26 RX ORDER — SODIUM CHLORIDE 9 G/ML
0.9 INJECTION, SOLUTION INTRAVENOUS
Qty: 1000 | Refills: 6 | Status: ACTIVE | COMMUNITY
Start: 2024-09-26 | End: 1900-01-01

## 2024-09-26 RX ORDER — SODIUM CHLORIDE 9 G/ML
0.9 INJECTION, SOLUTION INTRAVENOUS
Qty: 1 | Refills: 6 | Status: DISCONTINUED | COMMUNITY
Start: 2024-09-26 | End: 2024-09-26

## 2024-10-06 NOTE — DISEASE MANAGEMENT
[Clinical] : TNM Stage: c [IV] : IV [FreeTextEntry4] : Recurrent [TTNM] : x [NTNM] : 0 [MTNM] : 0 [de-identified] : 6600 cGy [de-identified] : 6600 cGy Gy [de-identified] : left neck / parotid gland

## 2024-10-06 NOTE — DISEASE MANAGEMENT
[Clinical] : TNM Stage: c [IV] : IV [FreeTextEntry4] : Recurrent [TTNM] : x [NTNM] : 0 [MTNM] : 0 [de-identified] : 6600 cGy [de-identified] : 6600 cGy Gy [de-identified] : left neck / parotid gland

## 2024-10-06 NOTE — HISTORY OF PRESENT ILLNESS
[FreeTextEntry1] : 89 y/o male with prostate cancer, DM2, anxiety, h/o total left auriculectomy for SCC (pT3Nx), h/o surgery for back BCC, h/o left neck surgery for SCC in 5/2024, h/o chemo for left neck SCC presents to discuss radiation options for left neck SCC.  7/2022 -- Total left auriculectomy for SCC, pT3Nx. Then immuno / chemotherapy with Dr Hernandez. 4/2024 -- PET scan done and CT of head done 5/2024 -- left neck surgery with Dr Bullock @ [facility] in [facility] showed negative margin.   7/5/2024 Consultation. c/o left neck pain, difficulty chewing, no taste, poor appetite.  9/12/2024 OTV. 23/33 fractions of RT to left neck / parotid completed. c/o left neck pain, difficulty chewing, no taste, poor appetite even before RT started. Rx blood test given to be done soon.  9/19/2024 OTV: 28/33 fractions of radiation therapy to left neck/parotid completed. Total dose given 5600/6600 cGy. Today, patient is feeling a lot of mouth pain. He has not eaten well in 5 days. ate some cucumbers and spaghetti yesterday. weight down 12 pounds from mid august. Some skin breakdown noted to left neck. Not taking any medications for pain. Notes some blood from mouth yesterday.   9/26/2024- OTV 33/33 fractions of RT to left neck. He notes some improvement in pain with use of magic mouthwash this week. Does not think his eating has improved. using silvadene twice daily to neck. Mild orthostasis 138-> 110 systolic when standing. No taste. Notes he is not eating or drinking well.

## 2024-10-06 NOTE — HISTORY OF PRESENT ILLNESS
[FreeTextEntry1] : 88 y/o male with prostate cancer, DM2, anxiety, h/o total left auriculectomy for SCC (pT3Nx), h/o surgery for back BCC, h/o left neck surgery for SCC in 5/2024, h/o chemo for left neck SCC presents to discuss radiation options for left neck SCC.  7/2022 -- Total left auriculectomy for SCC, pT3Nx. Then immuno / chemotherapy with Dr Hernandez. 4/2024 -- PET scan done and CT of head done 5/2024 -- left neck surgery with Dr Bullock @ [facility] in [facility] showed negative margin.   7/5/2024 Consultation. c/o left neck pain, difficulty chewing, no taste, poor appetite.  9/12/2024 OTV. 23/33 fractions of RT to left neck / parotid completed. c/o left neck pain, difficulty chewing, no taste, poor appetite even before RT started. Rx blood test given to be done soon.  9/19/2024 OTV: 28/33 fractions of radiation therapy to left neck/parotid completed. Total dose given 5600/6600 cGy. Today, patient is feeling a lot of mouth pain. He has not eaten well in 5 days. ate some cucumbers and spaghetti yesterday. weight down 12 pounds from mid august. Some skin breakdown noted to left neck. Not taking any medications for pain. Notes some blood from mouth yesterday.   9/26/2024- OTV 33/33 fractions of RT to left neck. He notes some improvement in pain with use of magic mouthwash this week. Does not think his eating has improved. using silvadene twice daily to neck. Mild orthostasis 138-> 110 systolic when standing. No taste. Notes he is not eating or drinking well.

## 2024-10-08 ENCOUNTER — NON-APPOINTMENT (OUTPATIENT)
Age: 89
End: 2024-10-08

## 2024-10-29 ENCOUNTER — APPOINTMENT (OUTPATIENT)
Dept: RADIATION ONCOLOGY | Facility: CLINIC | Age: 89
End: 2024-10-29

## 2024-12-31 NOTE — ED PROVIDER NOTE - DATE/TIME 1
Patient had 2D echo ordered in the setting of 1 out of 2 blood cultures being positive.  Echo notable for changes at the aortic valve which could represent focal calcification, fibroblastoma or vegetation.  Clinically my suspicion is lower for true endocarditis.  Case reviewed in detail with cardiologist including risk of MICHAEL and 2D echo again reviewed.  Plan for surveillance as above now.  Maintain off antibiotics  Will follow-up pending cultures  Surveillance blood cultures as above  Follow-up in our office  Follow-up in cardiology office for repeat 2D echo  Supportive care/discharge per primary   21-Oct-2022 19:32